# Patient Record
Sex: FEMALE | Race: OTHER | HISPANIC OR LATINO | URBAN - METROPOLITAN AREA
[De-identification: names, ages, dates, MRNs, and addresses within clinical notes are randomized per-mention and may not be internally consistent; named-entity substitution may affect disease eponyms.]

---

## 2017-04-25 ENCOUNTER — INPATIENT (INPATIENT)
Facility: HOSPITAL | Age: 57
LOS: 7 days | Discharge: HOME | DRG: 163 | End: 2017-05-03
Attending: HOSPITALIST | Admitting: HOSPITALIST
Payer: COMMERCIAL

## 2017-04-25 VITALS
HEART RATE: 105 BPM | RESPIRATION RATE: 18 BRPM | DIASTOLIC BLOOD PRESSURE: 69 MMHG | TEMPERATURE: 100 F | HEIGHT: 62 IN | SYSTOLIC BLOOD PRESSURE: 11 MMHG | OXYGEN SATURATION: 94 % | WEIGHT: 139.99 LBS

## 2017-04-25 DIAGNOSIS — J18.9 PNEUMONIA, UNSPECIFIED ORGANISM: ICD-10-CM

## 2017-04-25 DIAGNOSIS — J31.0 CHRONIC RHINITIS: ICD-10-CM

## 2017-04-25 DIAGNOSIS — K29.50 UNSPECIFIED CHRONIC GASTRITIS WITHOUT BLEEDING: ICD-10-CM

## 2017-04-25 DIAGNOSIS — E87.1 HYPO-OSMOLALITY AND HYPONATREMIA: ICD-10-CM

## 2017-04-25 DIAGNOSIS — D64.9 ANEMIA, UNSPECIFIED: ICD-10-CM

## 2017-04-25 DIAGNOSIS — Z98.891 HISTORY OF UTERINE SCAR FROM PREVIOUS SURGERY: Chronic | ICD-10-CM

## 2017-04-25 DIAGNOSIS — Z98.890 OTHER SPECIFIED POSTPROCEDURAL STATES: Chronic | ICD-10-CM

## 2017-04-25 DIAGNOSIS — Z29.9 ENCOUNTER FOR PROPHYLACTIC MEASURES, UNSPECIFIED: ICD-10-CM

## 2017-04-25 LAB
ALBUMIN SERPL ELPH-MCNC: 3.7 G/DL — SIGNIFICANT CHANGE UP (ref 3.3–5.2)
ALP SERPL-CCNC: 118 U/L — SIGNIFICANT CHANGE UP (ref 40–120)
ALT FLD-CCNC: 10 U/L — SIGNIFICANT CHANGE UP
ANION GAP SERPL CALC-SCNC: 14 MMOL/L — SIGNIFICANT CHANGE UP (ref 5–17)
ANISOCYTOSIS BLD QL: SLIGHT — SIGNIFICANT CHANGE UP
APPEARANCE UR: ABNORMAL
AST SERPL-CCNC: 15 U/L — SIGNIFICANT CHANGE UP
BACTERIA # UR AUTO: ABNORMAL
BILIRUB SERPL-MCNC: 0.5 MG/DL — SIGNIFICANT CHANGE UP (ref 0.4–2)
BILIRUB UR-MCNC: NEGATIVE — SIGNIFICANT CHANGE UP
BUN SERPL-MCNC: 16 MG/DL — SIGNIFICANT CHANGE UP (ref 8–20)
CALCIUM SERPL-MCNC: 9 MG/DL — SIGNIFICANT CHANGE UP (ref 8.6–10.2)
CHLORIDE SERPL-SCNC: 96 MMOL/L — LOW (ref 98–107)
CO2 SERPL-SCNC: 24 MMOL/L — SIGNIFICANT CHANGE UP (ref 22–29)
COLOR SPEC: YELLOW — SIGNIFICANT CHANGE UP
CREAT SERPL-MCNC: 0.52 MG/DL — SIGNIFICANT CHANGE UP (ref 0.5–1.3)
DIFF PNL FLD: ABNORMAL
ELLIPTOCYTES BLD QL SMEAR: SLIGHT — SIGNIFICANT CHANGE UP
EPI CELLS # UR: SIGNIFICANT CHANGE UP
GLUCOSE SERPL-MCNC: 133 MG/DL — HIGH (ref 70–115)
GLUCOSE UR QL: NEGATIVE MG/DL — SIGNIFICANT CHANGE UP
HCG UR QL: NEGATIVE — SIGNIFICANT CHANGE UP
HCT VFR BLD CALC: 35 % — LOW (ref 37–47)
HGB BLD-MCNC: 11.8 G/DL — LOW (ref 12–16)
KETONES UR-MCNC: ABNORMAL
LACTATE BLDV-MCNC: 0.7 MMOL/L — SIGNIFICANT CHANGE UP (ref 0.7–2)
LACTATE BLDV-MCNC: 1.2 MMOL/L — SIGNIFICANT CHANGE UP (ref 0.5–2)
LEUKOCYTE ESTERASE UR-ACNC: ABNORMAL
LIDOCAIN IGE QN: 21 U/L — LOW (ref 22–51)
LYMPHOCYTES # BLD AUTO: 3 % — LOW (ref 20–55)
MCHC RBC-ENTMCNC: 30.2 PG — SIGNIFICANT CHANGE UP (ref 27–31)
MCHC RBC-ENTMCNC: 33.7 G/DL — SIGNIFICANT CHANGE UP (ref 32–36)
MCV RBC AUTO: 89.5 FL — SIGNIFICANT CHANGE UP (ref 81–99)
MONOCYTES NFR BLD AUTO: 4 % — SIGNIFICANT CHANGE UP (ref 3–10)
NEUTROPHILS NFR BLD AUTO: 92 % — HIGH (ref 37–73)
NEUTS BAND # BLD: 1 % — SIGNIFICANT CHANGE UP (ref 0–8)
NITRITE UR-MCNC: NEGATIVE — SIGNIFICANT CHANGE UP
OVALOCYTES BLD QL SMEAR: SLIGHT — SIGNIFICANT CHANGE UP
PH UR: 5 — SIGNIFICANT CHANGE UP (ref 5–8)
PLAT MORPH BLD: SIGNIFICANT CHANGE UP
PLATELET # BLD AUTO: 342 K/UL — SIGNIFICANT CHANGE UP (ref 150–400)
POIKILOCYTOSIS BLD QL AUTO: SLIGHT — SIGNIFICANT CHANGE UP
POLYCHROMASIA BLD QL SMEAR: SLIGHT — SIGNIFICANT CHANGE UP
POTASSIUM SERPL-MCNC: 4 MMOL/L — SIGNIFICANT CHANGE UP (ref 3.5–5.3)
POTASSIUM SERPL-SCNC: 4 MMOL/L — SIGNIFICANT CHANGE UP (ref 3.5–5.3)
PROT SERPL-MCNC: 7.4 G/DL — SIGNIFICANT CHANGE UP (ref 6.6–8.7)
PROT UR-MCNC: 30 MG/DL
RAPID RVP RESULT: SIGNIFICANT CHANGE UP
RBC # BLD: 3.91 M/UL — LOW (ref 4.4–5.2)
RBC # FLD: 12.8 % — SIGNIFICANT CHANGE UP (ref 11–15.6)
RBC BLD AUTO: ABNORMAL
RBC CASTS # UR COMP ASSIST: ABNORMAL /HPF (ref 0–4)
SODIUM SERPL-SCNC: 134 MMOL/L — LOW (ref 135–145)
SP GR SPEC: 1.02 — SIGNIFICANT CHANGE UP (ref 1.01–1.02)
TROPONIN T SERPL-MCNC: <0.01 NG/ML — SIGNIFICANT CHANGE UP (ref 0–0.06)
UROBILINOGEN FLD QL: NEGATIVE MG/DL — SIGNIFICANT CHANGE UP
WBC # BLD: 27.2 K/UL — HIGH (ref 4.8–10.8)
WBC # FLD AUTO: 27.2 K/UL — HIGH (ref 4.8–10.8)
WBC UR QL: ABNORMAL

## 2017-04-25 PROCEDURE — 99223 1ST HOSP IP/OBS HIGH 75: CPT

## 2017-04-25 PROCEDURE — 93010 ELECTROCARDIOGRAM REPORT: CPT

## 2017-04-25 PROCEDURE — 99285 EMERGENCY DEPT VISIT HI MDM: CPT | Mod: 25

## 2017-04-25 PROCEDURE — 76705 ECHO EXAM OF ABDOMEN: CPT | Mod: 26

## 2017-04-25 PROCEDURE — 71020: CPT | Mod: 26

## 2017-04-25 RX ORDER — SODIUM CHLORIDE 9 MG/ML
1000 INJECTION INTRAMUSCULAR; INTRAVENOUS; SUBCUTANEOUS ONCE
Qty: 0 | Refills: 0 | Status: COMPLETED | OUTPATIENT
Start: 2017-04-25 | End: 2017-04-25

## 2017-04-25 RX ORDER — OMEPRAZOLE 10 MG/1
0 CAPSULE, DELAYED RELEASE ORAL
Qty: 0 | Refills: 0 | COMMUNITY

## 2017-04-25 RX ORDER — AZITHROMYCIN 500 MG/1
TABLET, FILM COATED ORAL
Qty: 0 | Refills: 0 | Status: DISCONTINUED | OUTPATIENT
Start: 2017-04-25 | End: 2017-04-29

## 2017-04-25 RX ORDER — AZITHROMYCIN 500 MG/1
500 TABLET, FILM COATED ORAL ONCE
Qty: 0 | Refills: 0 | Status: COMPLETED | OUTPATIENT
Start: 2017-04-25 | End: 2017-04-25

## 2017-04-25 RX ORDER — IBUPROFEN 200 MG
2 TABLET ORAL
Qty: 0 | Refills: 0 | COMMUNITY

## 2017-04-25 RX ORDER — FAMOTIDINE 10 MG/ML
20 INJECTION INTRAVENOUS ONCE
Qty: 0 | Refills: 0 | Status: COMPLETED | OUTPATIENT
Start: 2017-04-25 | End: 2017-04-25

## 2017-04-25 RX ORDER — KETOROLAC TROMETHAMINE 30 MG/ML
30 SYRINGE (ML) INJECTION ONCE
Qty: 0 | Refills: 0 | Status: DISCONTINUED | OUTPATIENT
Start: 2017-04-25 | End: 2017-04-25

## 2017-04-25 RX ORDER — MORPHINE SULFATE 50 MG/1
4 CAPSULE, EXTENDED RELEASE ORAL ONCE
Qty: 0 | Refills: 0 | Status: DISCONTINUED | OUTPATIENT
Start: 2017-04-25 | End: 2017-04-25

## 2017-04-25 RX ORDER — ONDANSETRON 8 MG/1
4 TABLET, FILM COATED ORAL EVERY 6 HOURS
Qty: 0 | Refills: 0 | Status: DISCONTINUED | OUTPATIENT
Start: 2017-04-25 | End: 2017-04-28

## 2017-04-25 RX ORDER — PANTOPRAZOLE SODIUM 20 MG/1
40 TABLET, DELAYED RELEASE ORAL
Qty: 0 | Refills: 0 | Status: DISCONTINUED | OUTPATIENT
Start: 2017-04-25 | End: 2017-04-28

## 2017-04-25 RX ORDER — MORPHINE SULFATE 50 MG/1
2 CAPSULE, EXTENDED RELEASE ORAL EVERY 6 HOURS
Qty: 0 | Refills: 0 | Status: DISCONTINUED | OUTPATIENT
Start: 2017-04-25 | End: 2017-04-28

## 2017-04-25 RX ORDER — ACETAMINOPHEN 500 MG
650 TABLET ORAL EVERY 6 HOURS
Qty: 0 | Refills: 0 | Status: DISCONTINUED | OUTPATIENT
Start: 2017-04-25 | End: 2017-04-28

## 2017-04-25 RX ORDER — FLUTICASONE PROPIONATE 50 MCG
1 SPRAY, SUSPENSION NASAL DAILY
Qty: 0 | Refills: 0 | Status: DISCONTINUED | OUTPATIENT
Start: 2017-04-25 | End: 2017-05-03

## 2017-04-25 RX ORDER — ENOXAPARIN SODIUM 100 MG/ML
40 INJECTION SUBCUTANEOUS EVERY 24 HOURS
Qty: 0 | Refills: 0 | Status: DISCONTINUED | OUTPATIENT
Start: 2017-04-25 | End: 2017-04-27

## 2017-04-25 RX ORDER — SODIUM CHLORIDE 9 MG/ML
3 INJECTION INTRAMUSCULAR; INTRAVENOUS; SUBCUTANEOUS ONCE
Qty: 0 | Refills: 0 | Status: COMPLETED | OUTPATIENT
Start: 2017-04-25 | End: 2017-04-25

## 2017-04-25 RX ORDER — KETOROLAC TROMETHAMINE 30 MG/ML
15 SYRINGE (ML) INJECTION EVERY 6 HOURS
Qty: 0 | Refills: 0 | Status: DISCONTINUED | OUTPATIENT
Start: 2017-04-25 | End: 2017-04-28

## 2017-04-25 RX ORDER — ONDANSETRON 8 MG/1
4 TABLET, FILM COATED ORAL ONCE
Qty: 0 | Refills: 0 | Status: COMPLETED | OUTPATIENT
Start: 2017-04-25 | End: 2017-04-25

## 2017-04-25 RX ORDER — FLUTICASONE PROPIONATE 50 MCG
1 SPRAY, SUSPENSION NASAL
Qty: 0 | Refills: 0 | COMMUNITY

## 2017-04-25 RX ORDER — CEFTRIAXONE 500 MG/1
1 INJECTION, POWDER, FOR SOLUTION INTRAMUSCULAR; INTRAVENOUS EVERY 24 HOURS
Qty: 0 | Refills: 0 | Status: DISCONTINUED | OUTPATIENT
Start: 2017-04-25 | End: 2017-05-02

## 2017-04-25 RX ORDER — AZITHROMYCIN 500 MG/1
500 TABLET, FILM COATED ORAL EVERY 24 HOURS
Qty: 0 | Refills: 0 | Status: DISCONTINUED | OUTPATIENT
Start: 2017-04-26 | End: 2017-04-29

## 2017-04-25 RX ORDER — CEFOTETAN DISODIUM 1 G
2 VIAL (EA) INJECTION EVERY 12 HOURS
Qty: 0 | Refills: 0 | Status: DISCONTINUED | OUTPATIENT
Start: 2017-04-25 | End: 2017-04-25

## 2017-04-25 RX ADMIN — Medication 100 GRAM(S): at 05:56

## 2017-04-25 RX ADMIN — Medication 30 MILLIGRAM(S): at 05:22

## 2017-04-25 RX ADMIN — ONDANSETRON 4 MILLIGRAM(S): 8 TABLET, FILM COATED ORAL at 05:22

## 2017-04-25 RX ADMIN — SODIUM CHLORIDE 3 MILLILITER(S): 9 INJECTION INTRAMUSCULAR; INTRAVENOUS; SUBCUTANEOUS at 05:22

## 2017-04-25 RX ADMIN — FAMOTIDINE 20 MILLIGRAM(S): 10 INJECTION INTRAVENOUS at 05:22

## 2017-04-25 RX ADMIN — Medication 650 MILLIGRAM(S): at 10:53

## 2017-04-25 RX ADMIN — Medication 650 MILLIGRAM(S): at 01:10

## 2017-04-25 RX ADMIN — MORPHINE SULFATE 4 MILLIGRAM(S): 50 CAPSULE, EXTENDED RELEASE ORAL at 05:55

## 2017-04-25 RX ADMIN — SODIUM CHLORIDE 1000 MILLILITER(S): 9 INJECTION INTRAMUSCULAR; INTRAVENOUS; SUBCUTANEOUS at 05:22

## 2017-04-25 RX ADMIN — CEFTRIAXONE 100 GRAM(S): 500 INJECTION, POWDER, FOR SOLUTION INTRAMUSCULAR; INTRAVENOUS at 13:41

## 2017-04-25 RX ADMIN — ENOXAPARIN SODIUM 40 MILLIGRAM(S): 100 INJECTION SUBCUTANEOUS at 10:48

## 2017-04-25 RX ADMIN — Medication 30 MILLIGRAM(S): at 05:55

## 2017-04-25 RX ADMIN — AZITHROMYCIN 255 MILLIGRAM(S): 500 TABLET, FILM COATED ORAL at 10:48

## 2017-04-25 RX ADMIN — MORPHINE SULFATE 4 MILLIGRAM(S): 50 CAPSULE, EXTENDED RELEASE ORAL at 05:19

## 2017-04-25 NOTE — ED PROVIDER NOTE - OBJECTIVE STATEMENT
55 yo female complains of chest pain, cough, fevers, chills, x several days.   h/o gallstones in past as well  no exacerbating or relieving factors.

## 2017-04-25 NOTE — H&P ADULT - PROBLEM SELECTOR PLAN 1
Will admit for IV antibiotics  Rocephin, Zithromax  Blood and sputum cultures  RVP    Oxygen as needed  Robitussin for cough  Tylenol for fever, pain  Repeat Chest X-Ray in am

## 2017-04-25 NOTE — H&P ADULT - ATTENDING COMMENTS
Discussed with patient and spouse. Offered option of home with PO with close follow up vs admit for IV. They decided to stay given follow up limited.

## 2017-04-25 NOTE — ED ADULT NURSE NOTE - OBJECTIVE STATEMENT
Pt. presents to ED with c/o epigastric pain and cough for past week. Pain worse with coughing, additional fever and weakness for past four days.

## 2017-04-25 NOTE — ED PROVIDER NOTE - CONSTITUTIONAL, MLM
normal... Well appearing, well nourished, awake, alert, oriented to person, place, time/situation. appears slgihtly uncomfortable

## 2017-04-25 NOTE — ED ADULT NURSE REASSESSMENT NOTE - NS ED NURSE REASSESS COMMENT FT1
pt received awake with oxygen via nasal cannula , breathing even and unlabored. pt with 20 gauge saline lock to left forearm no signs of infiltration noted. pt s/p ultrasound resting comfortably. pt admitted awaiting bed assignment

## 2017-04-25 NOTE — H&P ADULT - HISTORY OF PRESENT ILLNESS
56 year old female with PMHx Gastritis presents with left sided chest pain for 4 days. Describes as pounding, 8/10 in severity constant and radiating to left back associated with chills and fever for 1 day. She has been having Advil 2 pills every 4 hours. She did have cough recently, which has subsided. She also feels nauseous but denies any abdominal pain,  vomiting or constipation. She also states shes having diarrhea for past 3 days, loose 2-3 times/days, yellow, non-bloody.  Denies any palpitation, dizziness or headaches  Her  has an URTI.  She denies any smoking, DM or alcoholism.  Works in maintenance, cleaning offices.

## 2017-04-25 NOTE — ED PROVIDER NOTE - PROGRESS NOTE DETAILS
labs and imaging reviewed.   pneumonia + UTI  treated with abx, antipyretics, and iv fluids. initially given cefotetan b/c history of gallstones and ruq tenderness.   should cover lower resp tract and UTI

## 2017-04-26 DIAGNOSIS — E87.6 HYPOKALEMIA: ICD-10-CM

## 2017-04-26 LAB
ANION GAP SERPL CALC-SCNC: 16 MMOL/L — SIGNIFICANT CHANGE UP (ref 5–17)
BUN SERPL-MCNC: 18 MG/DL — SIGNIFICANT CHANGE UP (ref 8–20)
CALCIUM SERPL-MCNC: 8.6 MG/DL — SIGNIFICANT CHANGE UP (ref 8.6–10.2)
CHLORIDE SERPL-SCNC: 100 MMOL/L — SIGNIFICANT CHANGE UP (ref 98–107)
CO2 SERPL-SCNC: 24 MMOL/L — SIGNIFICANT CHANGE UP (ref 22–29)
CREAT SERPL-MCNC: 0.56 MG/DL — SIGNIFICANT CHANGE UP (ref 0.5–1.3)
GLUCOSE SERPL-MCNC: 97 MG/DL — SIGNIFICANT CHANGE UP (ref 70–115)
HCT VFR BLD CALC: 31.1 % — LOW (ref 37–47)
HGB BLD-MCNC: 10.3 G/DL — LOW (ref 12–16)
LEGIONELLA AG UR QL: NEGATIVE — SIGNIFICANT CHANGE UP
MCHC RBC-ENTMCNC: 29.9 PG — SIGNIFICANT CHANGE UP (ref 27–31)
MCHC RBC-ENTMCNC: 33.1 G/DL — SIGNIFICANT CHANGE UP (ref 32–36)
MCV RBC AUTO: 90.4 FL — SIGNIFICANT CHANGE UP (ref 81–99)
PLATELET # BLD AUTO: 344 K/UL — SIGNIFICANT CHANGE UP (ref 150–400)
POTASSIUM SERPL-MCNC: 3.5 MMOL/L — SIGNIFICANT CHANGE UP (ref 3.5–5.3)
POTASSIUM SERPL-SCNC: 3.5 MMOL/L — SIGNIFICANT CHANGE UP (ref 3.5–5.3)
RBC # BLD: 3.44 M/UL — LOW (ref 4.4–5.2)
RBC # FLD: 12.8 % — SIGNIFICANT CHANGE UP (ref 11–15.6)
SODIUM SERPL-SCNC: 140 MMOL/L — SIGNIFICANT CHANGE UP (ref 135–145)
WBC # BLD: 20.4 K/UL — HIGH (ref 4.8–10.8)
WBC # FLD AUTO: 20.4 K/UL — HIGH (ref 4.8–10.8)

## 2017-04-26 PROCEDURE — 99233 SBSQ HOSP IP/OBS HIGH 50: CPT

## 2017-04-26 RX ORDER — POTASSIUM CHLORIDE 20 MEQ
40 PACKET (EA) ORAL ONCE
Qty: 0 | Refills: 0 | Status: COMPLETED | OUTPATIENT
Start: 2017-04-26 | End: 2017-04-26

## 2017-04-26 RX ADMIN — CEFTRIAXONE 100 GRAM(S): 500 INJECTION, POWDER, FOR SOLUTION INTRAMUSCULAR; INTRAVENOUS at 11:27

## 2017-04-26 RX ADMIN — AZITHROMYCIN 255 MILLIGRAM(S): 500 TABLET, FILM COATED ORAL at 08:17

## 2017-04-26 RX ADMIN — Medication 1 SPRAY(S): at 11:27

## 2017-04-26 RX ADMIN — PANTOPRAZOLE SODIUM 40 MILLIGRAM(S): 20 TABLET, DELAYED RELEASE ORAL at 07:16

## 2017-04-26 RX ADMIN — Medication 650 MILLIGRAM(S): at 15:31

## 2017-04-26 RX ADMIN — ENOXAPARIN SODIUM 40 MILLIGRAM(S): 100 INJECTION SUBCUTANEOUS at 11:27

## 2017-04-26 RX ADMIN — Medication 650 MILLIGRAM(S): at 08:25

## 2017-04-26 RX ADMIN — Medication 100 MILLIGRAM(S): at 08:31

## 2017-04-26 NOTE — PROGRESS NOTE ADULT - ASSESSMENT
56 year old female with chronic gastritis presents with left sided chest pain, fever, leukocytosis with left shift and Chest X-Ray consistent with left lung CAP (Community acquired pneumonia). She doesn't seem to have overt sepsis. Given severity of leukocytosis, relative hypoxia and relative inability to follow up with PMD if she is sent home on PO antibiotics increases her risk despite PSI of II.  Clinically improved with resolving leukocytosis, afebrile, though still with symptoms.  Chronic gastritis with epigastric tenderness likely exacerbated by NSAID use, mild anemia.

## 2017-04-26 NOTE — PROGRESS NOTE ADULT - SUBJECTIVE AND OBJECTIVE BOX
HOSPITALIST PROGRESS NOTE    JOCELYN VALLECILLO  [id number]  56yFemale    Patient is a 56y old  Female who presents with a chief complaint of left sided chest pain x 4 days (25 Apr 2017 16:20)      SUBJECTIVE:   Chart reviewed since last visit.  Patient seen and examined at bedside earlier today for pneumonia, chest pain.  Feels better. Denies chills but getting night sweats.  Still with chest pain though improved - mild cough.  No longer nauseous.      OBJECTIVE:  Vital Signs Last 24 Hrs  T(C): 36.8, Max: 38.1 (04-26 @ 09:33)  T(F): 98.2, Max: 100.6 (04-26 @ 09:33)  HR: 84 (84 - 99)  BP: 113/55 (99/60 - 132/71)  RR: 18 (18 - 18)  SpO2: 95% (92% - 98%)    PHYSICAL EXAMINATION  General: NAD[+]   nontoxic[+]   ill-appearing[]  Obese[]  HEENT: AT/NC[+]  PERRLA[]  EOMI[]   Moist oral mucosa[]  Pharyngeal exudates[]  NECK: Supple[+]  JVD[] Carotid bruit[]  CVS: RRR[+]  Irregular[]  S1+S2[+]   Murmur[]  RESP: Fair air entry bilaterally[+]   Clear sounds[]   poor effort []  wheeze[-]   Crackles[+]LLL  GI: Soft[+]  Nondistended[+]   Decreased tenderness in epigastrium   Bowel Sounds[]  Mass[]   HSM[]   Ascites[]  : suprapubic tenderness[-]   CVA Tenderness[]   Egan[]  MS: FROM[-]   Edema[-]  CNS: AAOx3[-]      INTEG: Skin is Warm[+]  dry[] Lesion[] Decubitus[]  PSYCH: Fair Mood, Affect            I&O's Summary                          10.3   20.4  )-----------( 344      ( 26 Apr 2017 06:18 )             31.1       04-26    140  |  100  |  18.0  ----------------------------<  97  3.5   |  24.0  |  0.56    Ca    8.6      26 Apr 2017 06:18    TPro  7.4  /  Alb  3.7  /  TBili  0.5  /  DBili  x   /  AST  15  /  ALT  10  /  AlkPhos  118  04-25    CARDIAC MARKERS ( 25 Apr 2017 05:12 )  x     / <0.01 ng/mL / x     / x     / x          Culture: Pending            MEDICATIONS  (STANDING):  azithromycin  IVPB  IV Intermittent   enoxaparin Injectable 40milliGRAM(s) SubCutaneous every 24 hours  pantoprazole    Tablet 40milliGRAM(s) Oral before breakfast  cefTRIAXone   IVPB 1Gram(s) IV Intermittent every 24 hours  azithromycin  IVPB 500milliGRAM(s) IV Intermittent every 24 hours  fluticasone propionate 50 MICROgram(s)/spray Nasal Spray 1Spray(s) Both Nostrils daily      MEDICATIONS  (PRN):  acetaminophen   Tablet 650milliGRAM(s) Oral every 6 hours PRN For Temp greater than 38 C (100.4 F)  acetaminophen   Tablet. 650milliGRAM(s) Oral every 6 hours PRN Mild Pain (1 - 3)  guaiFENesin    Syrup 100milliGRAM(s) Oral every 6 hours PRN Cough  morphine  - Injectable 2milliGRAM(s) SubCutaneous every 6 hours PRN Severe Pain (7 - 10)  ketorolac   Injectable 15milliGRAM(s) IV Push every 6 hours PRN Moderate Pain (4 - 6)  ondansetron Injectable 4milliGRAM(s) IV Push every 6 hours PRN Nausea and/or Vomiting

## 2017-04-27 ENCOUNTER — RESULT REVIEW (OUTPATIENT)
Age: 57
End: 2017-04-27

## 2017-04-27 LAB
ANION GAP SERPL CALC-SCNC: 13 MMOL/L — SIGNIFICANT CHANGE UP (ref 5–17)
BUN SERPL-MCNC: 16 MG/DL — SIGNIFICANT CHANGE UP (ref 8–20)
CALCIUM SERPL-MCNC: 8.4 MG/DL — LOW (ref 8.6–10.2)
CHLORIDE SERPL-SCNC: 101 MMOL/L — SIGNIFICANT CHANGE UP (ref 98–107)
CO2 SERPL-SCNC: 23 MMOL/L — SIGNIFICANT CHANGE UP (ref 22–29)
CREAT SERPL-MCNC: 0.38 MG/DL — LOW (ref 0.5–1.3)
GLUCOSE SERPL-MCNC: 103 MG/DL — SIGNIFICANT CHANGE UP (ref 70–115)
POTASSIUM SERPL-MCNC: 3.6 MMOL/L — SIGNIFICANT CHANGE UP (ref 3.5–5.3)
POTASSIUM SERPL-SCNC: 3.6 MMOL/L — SIGNIFICANT CHANGE UP (ref 3.5–5.3)
SODIUM SERPL-SCNC: 137 MMOL/L — SIGNIFICANT CHANGE UP (ref 135–145)

## 2017-04-27 PROCEDURE — 71250 CT THORAX DX C-: CPT | Mod: 26

## 2017-04-27 PROCEDURE — 71020: CPT | Mod: 26

## 2017-04-27 PROCEDURE — 99255 IP/OBS CONSLTJ NEW/EST HI 80: CPT | Mod: 57

## 2017-04-27 PROCEDURE — 99233 SBSQ HOSP IP/OBS HIGH 50: CPT

## 2017-04-27 RX ADMIN — CEFTRIAXONE 100 GRAM(S): 500 INJECTION, POWDER, FOR SOLUTION INTRAMUSCULAR; INTRAVENOUS at 17:47

## 2017-04-27 RX ADMIN — MORPHINE SULFATE 2 MILLIGRAM(S): 50 CAPSULE, EXTENDED RELEASE ORAL at 09:15

## 2017-04-27 RX ADMIN — Medication 40 MILLIGRAM(S): at 15:41

## 2017-04-27 RX ADMIN — MORPHINE SULFATE 2 MILLIGRAM(S): 50 CAPSULE, EXTENDED RELEASE ORAL at 16:00

## 2017-04-27 RX ADMIN — MORPHINE SULFATE 2 MILLIGRAM(S): 50 CAPSULE, EXTENDED RELEASE ORAL at 15:39

## 2017-04-27 RX ADMIN — MORPHINE SULFATE 2 MILLIGRAM(S): 50 CAPSULE, EXTENDED RELEASE ORAL at 09:01

## 2017-04-27 RX ADMIN — AZITHROMYCIN 255 MILLIGRAM(S): 500 TABLET, FILM COATED ORAL at 15:40

## 2017-04-27 RX ADMIN — Medication 100 MILLIGRAM(S): at 17:48

## 2017-04-27 RX ADMIN — Medication 1 SPRAY(S): at 17:47

## 2017-04-27 NOTE — CONSULT NOTE ADULT - SUBJECTIVE AND OBJECTIVE BOX
Surgeon: Dr. Braga        HISTORY OF PRESENT ILLNESS:  56 year old female admitted to [facility] ER for complaints of left sided chest pain for 3 days.    PAST MEDICAL & SURGICAL HISTORY:  Chronic gastritis without bleeding, unspecified gastritis type  H/O right breast biopsy: States normal  History of       MEDICATIONS  (STANDING):  azithromycin  IVPB  IV Intermittent   enoxaparin Injectable 40milliGRAM(s) SubCutaneous every 24 hours  pantoprazole    Tablet 40milliGRAM(s) Oral before breakfast  cefTRIAXone   IVPB 1Gram(s) IV Intermittent every 24 hours  azithromycin  IVPB 500milliGRAM(s) IV Intermittent every 24 hours  fluticasone propionate 50 MICROgram(s)/spray Nasal Spray 1Spray(s) Both Nostrils daily  predniSONE   Tablet 40milliGRAM(s) Oral daily    MEDICATIONS  (PRN):  acetaminophen   Tablet 650milliGRAM(s) Oral every 6 hours PRN For Temp greater than 38 C (100.4 F)  acetaminophen   Tablet. 650milliGRAM(s) Oral every 6 hours PRN Mild Pain (1 - 3)  guaiFENesin    Syrup 100milliGRAM(s) Oral every 6 hours PRN Cough  morphine  - Injectable 2milliGRAM(s) SubCutaneous every 6 hours PRN Severe Pain (7 - 10)  ketorolac   Injectable 15milliGRAM(s) IV Push every 6 hours PRN Moderate Pain (4 - 6)  ondansetron Injectable 4milliGRAM(s) IV Push every 6 hours PRN Nausea and/or Vomiting    Antiplatelet therapy:                           Last dose/amt:    Allergies    No Known Allergies    Intolerances        SOCIAL HISTORY:  Smoker: [ ] Yes  [ ] No        PACK YEARS:                         WHEN QUIT?  ETOH use: [ ] Yes  [ ] No              FREQUENCY / QUANTITY:  Ilicit Drug use:  [ ] Yes  [ ] No  Occupation:  Live with:  Assisted device use:    FAMILY HISTORY:  No pertinent family history in first degree relatives      Review of Systems  CONSTITUTIONAL:  Fevers[ ] chills[ ] sweats[ ] fatigue[ ] weight loss[ ] weight gain [ ]                                     NEGATIVE [ ]   NEURO:  parathesias[ ] seizures [ ]  syncope [ ]  confusion [ ]                                                                                NEGATIVE[ ]   EYES: glasses[ ]  blurry vision[ ]  discharge[ ] pain[ ] glaucoma [ ]                                                                          NEGATIVE[ ]   ENMT:  difficulty hearing [ ]  vertigo[ ]  dysphagia[ ] epistaxis[ ] recent dental work [ ]                                    NEGATIVE[ ]   CV:  chest pain[ ] palpitations[ ] MUELLER [ ] diaphoresis [ ]                                                                                           NEGATIVE[ ]   RESPIRATORY:  wheezing[ ] SOB[ ] cough [ ] sputum[ ] hemoptysis[ ]                                                                  NEGATIVE[ ]   GI:  nausea[ ]  vommiting [ ]  diarrhea[ ] constipation [ ] melena [ ]                                                                      NEGATIVE[ ]   : hematuria[ ]  dysuria[ ] urgency[ ] incontinence[ ]                                                                                            NEGATIVE[ ]   MUSKULOSKELETAL:  arthritis[ ]  joint swelling [ ] muscle weakness [ ]                                                                NEGATIVE[ ]   SKIN/BREAST:  rash[ ] itching [ ]  hair loss[ ] masses[ ]                                                                                              NEGATIVE[ ]   PSYCH:  dementia [ ] depresion [ ] anxiety[ ]                                                                                                               NEGATIVE[ ]   HEME/LYMPH:  bruises easily[ ] enlarged lymph nodes[ ] tender lymph nodes[ ]                                               NEGATIVE[ ]   ENDOCRINE:  cold intolerance[ ] heat intolerance[ ] polydipsia[ ]                                                                          NEGATIVE[ ]     PHYSICAL EXAM  Vital Signs Last 24 Hrs  T(C): 37.1, Max: 37.6 ( @ 08:02)  T(F): 98.7, Max: 99.6 ( @ 08:02)  HR: 85 (84 - 98)  BP: 120/65 (113/55 - 127/69)  BP(mean): --  RR: 18 (18 - 20)  SpO2: 94% (91% - 95%)    CONSTITUTIONAL:                                                                          WNL[ ]   Neuro: WNL[ ] Normal exam oriented to person/place & time with no focal motor or sensory  deficits. Other __________                    Eyes: WNL[ ]   Normal exam of conjunctiva & lids, pupils equally reactive. Other______________________________     ENT: WNL[ ]    Normal exam of nasal/oral mucosa with absence of cyanosis. Other_____________________________  Neck: WNL[ ]  Normal exam of jugular veins, trachea & thyroid. Other_________________________________________  Chest: WNL[ ] Normal lung exam with good air movement absence of wheezes, rales, or rhonchi: Other_________________________________________                                                                                CV:  Auscultation: normal [ ] S3[ ] S4[ ] Irregular [ ] Rub[ ] Clicks[ ]    Murmurs none:[ ]systolic [ ]  diastolic [ ] holosystolic [ ]  Carotids: No Bruits[ ] Other____________ Abdominal Aorta: normal [ ] nonpalpable[ ]Other___________                                                                                      GI: WNL[ ] Normal exam of abdomen, liver & spleen with no noted masses or tenderness. Other______________________                                                                                                        Extremities: WNL[ ] Normal no evidence of cyanosis or deformity Edema: none[ ]trace[ ]1+[ ]2+[ ]3+[ ]4+[ ]  Lower Extremity Pulses: Right[ ] Left[ ]Varicosities[ ]  SKIN :WNL[ ] Normal exam to inspection & palation. Other:____________________                                                          LABS:                        10.3   20.4  )-----------( 344      ( 2017 06:18 )             31.1     -    137  |  101  |  16.0  ----------------------------<  103  3.6   |  23.0  |  0.38<L>    Ca    8.4<L>      2017 07:58                      Cardiac Cath:    TTE / DELORIS:      Assessment:          Plan: Surgeon: Dr. Braga        HISTORY OF PRESENT ILLNESS:  Pt. examined at bedside with .  56 year old Mosotho speaking female with PMH of chronic gastritis who was admitted to [facility] ER on 17 for complaints of left sided chest pain for 3 days with fever & chills.  Pt. also reports dyspnea & non productive cough. Pt. had abnormal chest xray which indicated left pleural effusion & pneumonia, also presented with leukocytosis.  On  CT of the chest indicated large multiloculated  left hemithorax pleural effusion with residual aeration of the anterior segment of the left upper lobe without midline shift.       PAST MEDICAL & SURGICAL HISTORY:  Chronic gastritis without bleeding, unspecified gastritis type  H/O right breast biopsy: States normal  History of       MEDICATIONS  (STANDING):  azithromycin  IVPB  IV Intermittent   enoxaparin Injectable 40milliGRAM(s) SubCutaneous every 24 hours  pantoprazole    Tablet 40milliGRAM(s) Oral before breakfast  cefTRIAXone   IVPB 1Gram(s) IV Intermittent every 24 hours  azithromycin  IVPB 500milliGRAM(s) IV Intermittent every 24 hours  fluticasone propionate 50 MICROgram(s)/spray Nasal Spray 1Spray(s) Both Nostrils daily  predniSONE   Tablet 40milliGRAM(s) Oral daily    MEDICATIONS  (PRN):  acetaminophen   Tablet 650milliGRAM(s) Oral every 6 hours PRN For Temp greater than 38 C (100.4 F)  acetaminophen   Tablet. 650milliGRAM(s) Oral every 6 hours PRN Mild Pain (1 - 3)  guaiFENesin    Syrup 100milliGRAM(s) Oral every 6 hours PRN Cough  morphine  - Injectable 2milliGRAM(s) SubCutaneous every 6 hours PRN Severe Pain (7 - 10)  ketorolac   Injectable 15milliGRAM(s) IV Push every 6 hours PRN Moderate Pain (4 - 6)  ondansetron Injectable 4milliGRAM(s) IV Push every 6 hours PRN Nausea and/or Vomiting    Antiplatelet therapy:                           Last dose/amt:    Allergies: No Known Allergies    SOCIAL HISTORY:  Smoker: [x] Yes  [ ] No        PACK YEARS:                         WHEN QUIT? 5 years ago  ETOH use: [x] Yes  [ ] No       on occasions  FREQUENCY / QUANTITY:  Ilicit Drug use:  [ ] Yes  [x] No  Occupation: Environmental   Live with:  & has one child  Assisted device use: none    FAMILY HISTORY:  No pertinent family history in first degree relatives      Review of Systems  CONSTITUTIONAL:  Fevers[x] chills[x] sweats[ ] fatigue[ ] weight loss[ ] weight gain [ ]                                     NEGATIVE [ ]   NEURO:  parathesias[ ] seizures [ ]  syncope [ ]  confusion [ ]                                                                        NEGATIVE[x]   EYES: glasses[ ]  blurry vision[ ]  discharge[ ] pain[ ] glaucoma [ ]                                                                  NEGATIVE[x]   ENMT:  difficulty hearing [ ]  vertigo[ ]  dysphagia[ ] epistaxis[ ] recent dental work [ ]                                       NEGATIVE[x]   CV:  chest pain[ ] palpitations[ ] MUELLER [x] diaphoresis [ ]                                                                                 NEGATIVE[ ]   RESPIRATORY:  wheezing[ ] SOB[ ] cough [x] sputum[ ] hemoptysis[ ]                                                          NEGATIVE[ ]   GI:  nausea[ ]  vommiting [ ]  diarrhea[ ] constipation [ ] melena [ ]                                                                  NEGATIVE[ ]   : hematuria[ ]  dysuria[ ] urgency[ ] incontinence[ ]                                                                                    NEGATIVE[x]   MUSKULOSKELETAL:  arthritis[ ]  joint swelling [ ] muscle weakness [ ]                                                        NEGATIVE[x]   SKIN/BREAST:  rash[ ] itching [ ]  hair loss[ ] masses[ ]                                                                                NEGATIVE[x]   PSYCH:  dementia [ ] depresion [ ] anxiety[ ]                                                                                                NEGATIVE[x]   HEME/LYMPH:  bruises easily[ ] enlarged lymph nodes[ ] tender lymph nodes[ ]                                             NEGATIVE[x]   ENDOCRINE:  cold intolerance[ ] heat intolerance[ ] polydipsia[ ]                                                                    NEGATIVE[x]     PHYSICAL EXAM  Vital Signs Last 24 Hrs  T(C): 37.1, Max: 37.6 ( @ 08:02)  T(F): 98.7, Max: 99.6 ( @ 08:02)  HR: 85 (84 - 98)  BP: 120/65 (113/55 - 127/69)  BP(mean): --  RR: 18 (18 - 20)  SpO2: 94% (91% - 95%)    CONSTITUTIONAL:                                                                         Neuro: WNL[x] Normal exam oriented to person/place & time with no focal motor or sensory  deficits. Other __________                    Eyes: WNL[x]   Normal exam of conjunctiva & lids, pupils equally reactive. Other______________________________     ENT: WNL[x]    Normal exam of nasal/oral mucosa with absence of cyanosis. Other_____________________________  Neck: WNL[x]  Normal exam of jugular veins, trachea & thyroid. Other_________________________________________  Chest: WNL[ ] Normal lung exam with good air movement absence of wheezes, rales, or rhonchi: Other: Diminished  ___________________________________                                                                                CV:  Auscultation: normal [x] S3[ ] S4[ ] Irregular [ ] Rub[ ] Clicks[ ]    Murmurs none:[x]systolic [ ]  diastolic [ ] holosystolic [ ]  Carotids: No Bruits[x]Other____________ Abdominal Aorta: normal [ ] nonpalpable[ ]Other___________                                                                                      GI: WNL[x] Normal exam of abdomen, liver & spleen with no noted masses or tenderness. Other______________________                                                                                                        Extremities: WNL[x] Normal no evidence of cyanosis or deformity Edema: none[ ]trace[ ]1+[ ]2+[ ]3+[ ]4+[ ]  Lower Extremity Pulses: Right[x] Left[x]Varicosities[ ]  SKIN :WNL[x] Normal exam to inspection & palation. Other:____________________                                                          LABS:                        10.3   20.4  )-----------( 344      ( 2017 06:18 )             31.1     -    137  |  101  |  16.0  ----------------------------<  103  3.6   |  23.0  |  0.38<L>    Ca    8.4<L>      2017 07:58        Chest CT:PROCEDURE DATE:  2017        INTERPRETATION:  Chest CT without contrast .  COMPARISON: Chest x-ray of 2017.  CLINICAL INFORMATION: Pacify left hemithorax. Assess for mass, effusion,   lobar consolidation..  TECHNIQUE: Contiguous axial 2.5 mm slice thickness images of the chest   were obtained without intravenous contrast administration.  FINDINGS:  There is multifocal large loculated pleural effusions with complete   atelectasis of the left lower lobe and residual aeration of the left   upper lobe anterior segment. The left upper lobe a loculated effusion   measures 11 x 6.6 cm and the left lower lobe loculated effusion measures   8.3 x 5.1 cm on sagittal imaging. Malignant effusion cannot be excluded.   The  Right lung parenchyma remains clear. There is a small right effusion.  The trachea and right left mainstem bronchi are patent. There is   narrowing of the left upper and lower lobe segmental bronchi.    Left hilar mass cannot be excluded. There is no pericardial effusion.   There is mild cardiomegaly. Thoracic aorta is normal.    Upper abdominal viscera unremarkable.  Osseous structures intact.     IMPRESSION:    Large multiloculated  left hemithorax pleural effusion with residual   aeration of the anterior segment of the left upper lobe without midline   shift. Right lung clear aside from a small right effusion. Left hilar       Assessment: 56 year old [place]o speaking female with PMH as stated as above,  pt. had abnormal chest xray which indicated left pleural effusion & pneumonia, & also presented with leukocytosis.  On  CT of the chest indicated large multiloculated  left hemithorax pleural effusion with residual aeration of the anterior segment of the left upper lobe without midline shift.     Plan:  NPO after midnight.  Decortication of the left lung scheduled for 17.  Discussed plan with Dr. Braga.

## 2017-04-27 NOTE — PROGRESS NOTE ADULT - ASSESSMENT
56 year old female with chronic gastritis presents with left sided chest pain, fever, leukocytosis with left shift and Chest X-Ray consistent with left lung CAP (Community acquired pneumonia). She doesn't seem to have overt sepsis. Given severity of leukocytosis, relative hypoxia and relative inability to follow up with PMD if she is sent home on PO antibiotics increases her risk despite PSI of II.  Continues to have symptoms, worsening Chest X-Ray. CTscan Chest (04/27/17)with Large multiloculated  left hemithorax pleural effusion with residual aeration of the anterior segment of the left upper lobe without midline shift   Chronic gastritis with epigastric tenderness likely exacerbated by NSAID use, mild anemia.

## 2017-04-27 NOTE — CONSULT NOTE ADULT - ATTENDING COMMENTS
Pt seen and images reviewed.  Loculated Lt pleural effusion suspicous for empyema.  Will plan for VATS tomorrow (Dr. Bejarano)

## 2017-04-27 NOTE — CONSULT NOTE ADULT - ASSESSMENT
CAP with complex pleural effusion.  Possible empyema.  Likely pneumococcal pneumonia.      Plan:Thoracic surgery evaluation.  Will likely require VATS.  Consideration toward percutaneous drainage but unlikely to be successful.  Current antibiotics likely adequate.  Add prednisone for 3 days at 40 mg per day  Discussed with  and Maria Luz.

## 2017-04-27 NOTE — PROGRESS NOTE ADULT - SUBJECTIVE AND OBJECTIVE BOX
HOSPITALIST PROGRESS NOTE    JOCELYN VALLECILLO  [id number]  56yFemale    Patient is a 56y old  Female who presents with a chief complaint of left sided chest pain x 4 days (25 Apr 2017 16:20)      SUBJECTIVE:   Chart reviewed since last visit.  Patient seen and examined at bedside earlier today for CAP.  Still with chills and fever.  Feels minimally better if at all.  Mild cough - non-productive.  Still dyspneic, worse on ambulation to bathroom.  Chest pain and back pain still present.  No nausea vomiting or abdominal pain.      OBJECTIVE:  Vital Signs Last 24 Hrs  T(C): 37.1, Max: 37.6 (04-27 @ 08:02)  T(F): 98.7, Max: 99.6 (04-27 @ 08:02)  HR: 85 (85 - 98)  BP: 120/65 (118/67 - 127/69)  RR: 18 (18 - 20)  SpO2: 94% (91% - 94%)    PHYSICAL EXAMINATION  General: NAD[+]   nontoxic[+]     HEENT: AT/NC[+]   NECK: Supple[+]    CVS: RRR[+]  S1+S2[+]    RESP: Fair air entry bilaterally[+]   wheeze[-]   Crackles[+]LLL Decreased sounds left lung  GI: Soft[+]  Nondistended[+]     Bowel Sounds[+]    : suprapubic tenderness[-]     MS: FROM[-]   Edema[-]  CNS: AAOx3[-]      INTEG: Skin is Warm[+]  dry[]   PSYCH: Fair Mood, Affect        I&O's Summary                          10.3   20.4  )-----------( 344      ( 26 Apr 2017 06:18 )             31.1       04-27    137  |  101  |  16.0  ----------------------------<  103  3.6   |  23.0  |  0.38<L>    Ca    8.4<L>      27 Apr 2017 07:58      Culture - Blood (04.25.17 @ 05:55)    Specimen Source: .Blood Blood    Culture Results:   No growth at 48 hours     EXAM:  CT CHEST                          PROCEDURE DATE:  04/27/2017        INTERPRETATION:  Chest CT without contrast .  COMPARISON: Chest x-ray of 4/26/2017.  CLINICAL INFORMATION: Pacify left hemithorax. Assess for mass, effusion,   lobar consolidation..  TECHNIQUE: Contiguous axial 2.5 mm slice thickness images of the chest   were obtained without intravenous contrast administration.  FINDINGS:  There is multifocal large loculated pleural effusions with complete   atelectasis of the left lower lobe and residual aeration of the left   upper lobe anterior segment. The left upper lobe a loculated effusion   measures 11 x 6.6 cm and the left lower lobe loculated effusion measures   8.3 x 5.1 cm on sagittal imaging. Malignant effusion cannot be excluded.   The  Right lung parenchyma remains clear. There is a small right effusion.  The trachea and right left mainstem bronchi are patent. There is   narrowing of the left upper and lower lobe segmental bronchi.    Left hilar mass cannot be excluded. There is no pericardial effusion.   There is mild cardiomegaly. Thoracic aorta is normal.    Upper abdominal viscera unremarkable.  Osseous structures intact.     IMPRESSION:    Large multiloculated  left hemithorax pleural effusion with residual   aeration of the anterior segment of the left upper lobe without midline   shift. Right lung clear aside from a small right effusion. Left hilar   mass cannot be excluded. Malignant effusion cannot be excluded..                LAXMI LANE M.D., ATTENDING RADIOLOGIST  This document has been electronically signed. Apr 27 2017  1:54PM          MEDICATIONS  (STANDING):  azithromycin  IVPB  IV Intermittent   pantoprazole    Tablet 40milliGRAM(s) Oral before breakfast  cefTRIAXone   IVPB 1Gram(s) IV Intermittent every 24 hours  azithromycin  IVPB 500milliGRAM(s) IV Intermittent every 24 hours  fluticasone propionate 50 MICROgram(s)/spray Nasal Spray 1Spray(s) Both Nostrils daily  predniSONE   Tablet 40milliGRAM(s) Oral daily      MEDICATIONS  (PRN):  acetaminophen   Tablet 650milliGRAM(s) Oral every 6 hours PRN For Temp greater than 38 C (100.4 F)  acetaminophen   Tablet. 650milliGRAM(s) Oral every 6 hours PRN Mild Pain (1 - 3)  guaiFENesin    Syrup 100milliGRAM(s) Oral every 6 hours PRN Cough  morphine  - Injectable 2milliGRAM(s) SubCutaneous every 6 hours PRN Severe Pain (7 - 10)  ketorolac   Injectable 15milliGRAM(s) IV Push every 6 hours PRN Moderate Pain (4 - 6)  ondansetron Injectable 4milliGRAM(s) IV Push every 6 hours PRN Nausea and/or Vomiting

## 2017-04-27 NOTE — CONSULT NOTE ADULT - SUBJECTIVE AND OBJECTIVE BOX
PULMONARY CONSULT NOTE      GIOVANNA VALLECILLO-[id number]    Patient is a 56y old  Female who presents with a chief complaint of left sided chest pain x 4 days (2017 16:20)  Patient without prior significant pulmonary history with 2 weeks of cough, chills, ?fevers presented to ER with abnormal chest xray, increased WBC's and admitted for pneumonia.  Low grade temp episodically with Ceftriaxone and Azithromycin and repeat chest film with evidence of obscuration or left hemithorax.  Patient with persistent cough but minimal sputum.  +dyspnea.  Smokes approximately 2 cigarettes per day until 5 years ago.  No significant family history.  Gives history of gastritis.  Currently in no acute distress.     INTERVAL HPI/OVERNIGHT EVENTS:    MEDICATIONS  (STANDING):  azithromycin  IVPB  IV Intermittent   enoxaparin Injectable 40milliGRAM(s) SubCutaneous every 24 hours  pantoprazole    Tablet 40milliGRAM(s) Oral before breakfast  cefTRIAXone   IVPB 1Gram(s) IV Intermittent every 24 hours  azithromycin  IVPB 500milliGRAM(s) IV Intermittent every 24 hours  fluticasone propionate 50 MICROgram(s)/spray Nasal Spray 1Spray(s) Both Nostrils daily  predniSONE   Tablet 40milliGRAM(s) Oral daily      MEDICATIONS  (PRN):  acetaminophen   Tablet 650milliGRAM(s) Oral every 6 hours PRN For Temp greater than 38 C (100.4 F)  acetaminophen   Tablet. 650milliGRAM(s) Oral every 6 hours PRN Mild Pain (1 - 3)  guaiFENesin    Syrup 100milliGRAM(s) Oral every 6 hours PRN Cough  morphine  - Injectable 2milliGRAM(s) SubCutaneous every 6 hours PRN Severe Pain (7 - 10)  ketorolac   Injectable 15milliGRAM(s) IV Push every 6 hours PRN Moderate Pain (4 - 6)  ondansetron Injectable 4milliGRAM(s) IV Push every 6 hours PRN Nausea and/or Vomiting      Allergies    No Known Allergies    Intolerances        PAST MEDICAL & SURGICAL HISTORY:  Chronic gastritis without bleeding, unspecified gastritis type  H/O right breast biopsy: States normal  History of       FAMILY HISTORY:  No pertinent family history in first degree relatives      SOCIAL HISTORY  Smoking History: Per HPI    REVIEW OF SYSTEMS:    CONSTITUTIONAL:  As per HPI.    HEENT:  Eyes:  No diplopia or blurred vision. ENT:  No earache, sore throat or runny nose.    CARDIOVASCULAR:  No pressure, squeezing, tightness, heaviness or aching about the chest; no palpitations.    RESPIRATORY:  Per HPI    GASTROINTESTINAL:  No nausea, vomiting or diarrhea.    GENITOURINARY:  No dysuria, frequency or urgency.    MUSCULOSKELETAL:  No joint pains    SKIN:  No new lesions.    NEUROLOGIC:  No paresthesias, fasciculations, seizures or weakness.    PSYCHIATRIC:  No disorder of thought or mood.    ENDOCRINE:  No heat or cold intolerance, polyuria or polydipsia.    HEMATOLOGICAL:  No easy bruising or bleeding.     Vital Signs Last 24 Hrs  T(C): 37.6, Max: 38.1 ( @ 16:07)  T(F): 99.6, Max: 100.6 ( @ 16:07)  HR: 98 (84 - 98)  BP: 118/67 (113/55 - 132/71)  BP(mean): --  RR: 20 (18 - 20)  SpO2: 91% (91% - 95%)    PHYSICAL EXAMINATION:    GENERAL: The patient is a well-developed, well-nourished _____in no apparent distress.     HEENT: Head is normocephalic and atraumatic. Extraocular muscles are intact. Mucous membranes are moist.     NECK: Supple.     LUNGS:Markedly diminished breath sounds left     HEART: Regular rate and rhythm without murmur.    ABDOMEN: Soft, nontender, and nondistended.  No hepatosplenomegaly is noted.    EXTREMITIES: Without any cyanosis, clubbing, rash, lesions or edema.    NEUROLOGIC: Grossly intact.    SKIN: No ulceration or induration present.      LABS:                        10.3   20.4  )-----------( 344      ( 2017 06:18 )             31.1         137  |  101  |  16.0  ----------------------------<  103  3.6   |  23.0  |  0.38<L>    Ca    8.4<L>      2017 07:58                          MICROBIOLOGY:    RADIOLOGY & ADDITIONAL STUDIES:  INTERPRETATION:  Chest CT without contrast .  COMPARISON: Chest x-ray of 2017.  CLINICAL INFORMATION: Pacify left hemithorax. Assess for mass, effusion,   lobar consolidation..  TECHNIQUE: Contiguous axial 2.5 mm slice thickness images of the chest   were obtained without intravenous contrast administration.  FINDINGS:  There is multifocal large loculated pleural effusions with complete   atelectasis of the left lower lobe and residual aeration of the left   upper lobe anterior segment. The left upper lobe a loculated effusion   measures 11 x 6.6 cm and the left lower lobe loculated effusion measures   8.3 x 5.1 cm on sagittal imaging. Malignant effusion cannot be excluded.   The  Right lung parenchyma remains clear. There is a small right effusion.  The trachea and right left mainstem bronchi are patent. There is   narrowing of the left upper and lower lobe segmental bronchi.    Left hilar mass cannot be excluded. There is no pericardial effusion.   There is mild cardiomegaly. Thoracic aorta is normal.    Upper abdominal viscera unremarkable.  Osseous structures intact.     IMPRESSION:    Large multiloculated  left hemithorax pleural effusion with residual   aeration of the anterior segment of the left upper lobe without midline   shift. Right lung clear aside from a small right effusion. Left hilar   mass cannot be excluded. Malignant e

## 2017-04-28 ENCOUNTER — APPOINTMENT (OUTPATIENT)
Dept: THORACIC SURGERY | Facility: HOSPITAL | Age: 57
End: 2017-04-28

## 2017-04-28 ENCOUNTER — TRANSCRIPTION ENCOUNTER (OUTPATIENT)
Age: 57
End: 2017-04-28

## 2017-04-28 LAB
ABO RH CONFIRMATION: SIGNIFICANT CHANGE UP
BLD GP AB SCN SERPL QL: SIGNIFICANT CHANGE UP
TYPE + AB SCN PNL BLD: SIGNIFICANT CHANGE UP

## 2017-04-28 PROCEDURE — 88305 TISSUE EXAM BY PATHOLOGIST: CPT | Mod: 26

## 2017-04-28 PROCEDURE — 32652 THORACOSCOPY REM TOTL CORTEX: CPT | Mod: GC

## 2017-04-28 PROCEDURE — 71010: CPT | Mod: 26

## 2017-04-28 PROCEDURE — 31622 DX BRONCHOSCOPE/WASH: CPT | Mod: GC

## 2017-04-28 PROCEDURE — 99233 SBSQ HOSP IP/OBS HIGH 50: CPT

## 2017-04-28 RX ORDER — FENTANYL CITRATE 50 UG/ML
50 INJECTION INTRAVENOUS
Qty: 0 | Refills: 0 | Status: DISCONTINUED | OUTPATIENT
Start: 2017-04-28 | End: 2017-04-28

## 2017-04-28 RX ORDER — IPRATROPIUM/ALBUTEROL SULFATE 18-103MCG
3 AEROSOL WITH ADAPTER (GRAM) INHALATION EVERY 6 HOURS
Qty: 0 | Refills: 0 | Status: DISCONTINUED | OUTPATIENT
Start: 2017-04-28 | End: 2017-05-03

## 2017-04-28 RX ORDER — HYDROMORPHONE HYDROCHLORIDE 2 MG/ML
0.5 INJECTION INTRAMUSCULAR; INTRAVENOUS; SUBCUTANEOUS
Qty: 0 | Refills: 0 | Status: DISCONTINUED | OUTPATIENT
Start: 2017-04-28 | End: 2017-04-28

## 2017-04-28 RX ORDER — ACETAMINOPHEN 500 MG
650 TABLET ORAL EVERY 6 HOURS
Qty: 0 | Refills: 0 | Status: DISCONTINUED | OUTPATIENT
Start: 2017-04-28 | End: 2017-05-03

## 2017-04-28 RX ORDER — FENTANYL CITRATE 50 UG/ML
30 INJECTION INTRAVENOUS
Qty: 0 | Refills: 0 | Status: DISCONTINUED | OUTPATIENT
Start: 2017-04-28 | End: 2017-04-29

## 2017-04-28 RX ORDER — DOCUSATE SODIUM 100 MG
100 CAPSULE ORAL THREE TIMES A DAY
Qty: 0 | Refills: 0 | Status: DISCONTINUED | OUTPATIENT
Start: 2017-04-28 | End: 2017-05-03

## 2017-04-28 RX ORDER — KETOROLAC TROMETHAMINE 30 MG/ML
30 SYRINGE (ML) INJECTION EVERY 6 HOURS
Qty: 0 | Refills: 0 | Status: DISCONTINUED | OUTPATIENT
Start: 2017-04-28 | End: 2017-05-01

## 2017-04-28 RX ORDER — SODIUM CHLORIDE 9 MG/ML
1000 INJECTION, SOLUTION INTRAVENOUS
Qty: 0 | Refills: 0 | Status: DISCONTINUED | OUTPATIENT
Start: 2017-04-28 | End: 2017-04-28

## 2017-04-28 RX ORDER — ENOXAPARIN SODIUM 100 MG/ML
40 INJECTION SUBCUTANEOUS DAILY
Qty: 0 | Refills: 0 | Status: DISCONTINUED | OUTPATIENT
Start: 2017-04-29 | End: 2017-05-03

## 2017-04-28 RX ORDER — ONDANSETRON 8 MG/1
4 TABLET, FILM COATED ORAL ONCE
Qty: 0 | Refills: 0 | Status: DISCONTINUED | OUTPATIENT
Start: 2017-04-28 | End: 2017-04-28

## 2017-04-28 RX ORDER — PANTOPRAZOLE SODIUM 20 MG/1
40 TABLET, DELAYED RELEASE ORAL
Qty: 0 | Refills: 0 | Status: DISCONTINUED | OUTPATIENT
Start: 2017-04-29 | End: 2017-05-03

## 2017-04-28 RX ADMIN — CEFTRIAXONE 100 GRAM(S): 500 INJECTION, POWDER, FOR SOLUTION INTRAMUSCULAR; INTRAVENOUS at 13:30

## 2017-04-28 RX ADMIN — FENTANYL CITRATE 30 MILLILITER(S): 50 INJECTION INTRAVENOUS at 17:43

## 2017-04-28 RX ADMIN — AZITHROMYCIN 255 MILLIGRAM(S): 500 TABLET, FILM COATED ORAL at 10:39

## 2017-04-28 RX ADMIN — FENTANYL CITRATE 30 MILLILITER(S): 50 INJECTION INTRAVENOUS at 19:45

## 2017-04-28 RX ADMIN — Medication 30 MILLIGRAM(S): at 11:21

## 2017-04-28 RX ADMIN — Medication 100 MILLIGRAM(S): at 21:37

## 2017-04-28 RX ADMIN — FENTANYL CITRATE 30 MILLILITER(S): 50 INJECTION INTRAVENOUS at 10:59

## 2017-04-28 RX ADMIN — Medication 3 MILLILITER(S): at 20:40

## 2017-04-28 RX ADMIN — Medication 30 MILLIGRAM(S): at 11:53

## 2017-04-28 RX ADMIN — Medication 3 MILLILITER(S): at 14:00

## 2017-04-28 NOTE — BRIEF OPERATIVE NOTE - PROCEDURE
Bronchoscopy of both lungs  04/28/2017    Active  CELENA  Decortication  of lung  04/28/2017  left lung  Lysis of adhesions/loculations  Active  JWIRIS

## 2017-04-28 NOTE — PROGRESS NOTE ADULT - SUBJECTIVE AND OBJECTIVE BOX
HOSPITALIST PROGRESS NOTE    JOCELYN VALLECILLO  [id number]  56yFemale    Patient is a 56y old  Female who presents with a chief complaint of left sided chest pain x 4 days (25 Apr 2017 16:20)      SUBJECTIVE:   Chart reviewed since last visit.  Patient seen and examined in PACU for pneumonia effusion  VATS earlier today  Cough+  Pain controlled currently.  No fever or chills      OBJECTIVE:  Vital Signs Last 24 Hrs  T(C): 2.9, Max: 37.8 (04-28 @ 10:00)  T(F): 37.3, Max: 100.1 (04-28 @ 10:00)  HR: 80 (72 - 96)  BP: 114/72 (92/70 - 150/76)  RR: 24 (13 - 27)  SpO2: 100% (96% - 100%)    PHYSICAL EXAMINATION  General: NAD[+]   nontoxic[+]     HEENT: AT/NC[+]   NECK: Supple[+]    CVS: RRR[+]  S1+S2[+]    RESP: Fair air entry bilaterally[+]   wheeze[-]   Crackles[+]LLL Decreased sounds left lung Chest tubes x 2 left chest  GI: Soft[+]  Nondistended[+]     Bowel Sounds[+]    : suprapubic tenderness[-]     MS: FROM[-]   Edema[-]  CNS: AAOx3[-]      INTEG: Skin is Warm[+]  dry[]   PSYCH: Fair Mood, Affect  MONITOR:  CAPILLARY BLOOD GLUCOSE        I&O's Summary    I & Os for current day (as of 28 Apr 2017 17:33)  =============================================  IN: 400 ml / OUT: 260 ml / NET: 140 ml        04-27    137  |  101  |  16.0  ----------------------------<  103  3.6   |  23.0  |  0.38<L>    Ca    8.4<L>      27 Apr 2017 07:58              MEDICATIONS  (STANDING):  azithromycin  IVPB  IV Intermittent   cefTRIAXone   IVPB 1Gram(s) IV Intermittent every 24 hours  azithromycin  IVPB 500milliGRAM(s) IV Intermittent every 24 hours  fluticasone propionate 50 MICROgram(s)/spray Nasal Spray 1Spray(s) Both Nostrils daily  predniSONE   Tablet 40milliGRAM(s) Oral daily  lactated ringers. 1000milliLiter(s) IV Continuous <Continuous>  docusate sodium 100milliGRAM(s) Oral three times a day  ALBUTerol/ipratropium for Nebulization 3milliLiter(s) Nebulizer every 6 hours  fentaNYL PCA (50 MICROgram(s)/mL) 30milliLiter(s) PCA Continuous PCA Continuous      MEDICATIONS  (PRN):  guaiFENesin    Syrup 100milliGRAM(s) Oral every 6 hours PRN Cough  fentaNYL    Injectable 50MICROGram(s) IV Push every 5 minutes PRN Severe Pain  ondansetron Injectable 4milliGRAM(s) IV Push once PRN Nausea and/or Vomiting  promethazine IVPB 6.25milliGRAM(s) IV Intermittent once PRN Nausea and/or Vomiting  ketorolac   Injectable 30milliGRAM(s) IV Push every 6 hours PRN Moderate Pain (4 - 6)  acetaminophen   Tablet. 650milliGRAM(s) Oral every 6 hours PRN Mild Pain (1 - 3)

## 2017-04-28 NOTE — PROGRESS NOTE ADULT - ASSESSMENT
56 year old female with chronic gastritis presents with left sided chest pain, fever, leukocytosis with left shift and Chest X-Ray consistent with left lung CAP (Community acquired pneumonia). She doesn't seem to have overt sepsis. Given severity of leukocytosis, relative hypoxia and relative inability to follow up with PMD if she is sent home on PO antibiotics increases her risk despite PSI of II.  Continues to have symptoms, worsening Chest X-Ray. CTscan Chest (04/27/17)with Large multiloculated  left hemithorax pleural effusion with residual aeration of the anterior segment of the left upper lobe without midline shift. Underwent VATS, decortication by CTS on 04/28/17.  Chronic gastritis with epigastric tenderness likely exacerbated by NSAID use, mild anemia.

## 2017-04-29 DIAGNOSIS — J18.9 PNEUMONIA, UNSPECIFIED ORGANISM: ICD-10-CM

## 2017-04-29 LAB
NIGHT BLUE STAIN TISS: SIGNIFICANT CHANGE UP
SPECIMEN SOURCE: SIGNIFICANT CHANGE UP

## 2017-04-29 PROCEDURE — 99233 SBSQ HOSP IP/OBS HIGH 50: CPT

## 2017-04-29 PROCEDURE — 71010: CPT | Mod: 26

## 2017-04-29 RX ADMIN — CEFTRIAXONE 100 GRAM(S): 500 INJECTION, POWDER, FOR SOLUTION INTRAMUSCULAR; INTRAVENOUS at 17:38

## 2017-04-29 RX ADMIN — Medication 3 MILLILITER(S): at 08:27

## 2017-04-29 RX ADMIN — Medication 3 MILLILITER(S): at 21:02

## 2017-04-29 RX ADMIN — Medication 30 MILLIGRAM(S): at 00:56

## 2017-04-29 RX ADMIN — FENTANYL CITRATE 30 MILLILITER(S): 50 INJECTION INTRAVENOUS at 07:44

## 2017-04-29 RX ADMIN — Medication 30 MILLILITER(S): at 01:03

## 2017-04-29 RX ADMIN — Medication 1 SPRAY(S): at 17:37

## 2017-04-29 RX ADMIN — Medication 100 MILLIGRAM(S): at 06:50

## 2017-04-29 RX ADMIN — Medication 3 MILLILITER(S): at 15:40

## 2017-04-29 RX ADMIN — Medication 40 MILLIGRAM(S): at 06:51

## 2017-04-29 RX ADMIN — PANTOPRAZOLE SODIUM 40 MILLIGRAM(S): 20 TABLET, DELAYED RELEASE ORAL at 06:51

## 2017-04-29 RX ADMIN — ENOXAPARIN SODIUM 40 MILLIGRAM(S): 100 INJECTION SUBCUTANEOUS at 13:29

## 2017-04-29 RX ADMIN — Medication 100 MILLIGRAM(S): at 23:08

## 2017-04-29 RX ADMIN — Medication 100 MILLIGRAM(S): at 15:48

## 2017-04-29 NOTE — PROGRESS NOTE ADULT - ASSESSMENT
57 y/o F 4/27 admitted to [facility] ER on 4/25/17 for c/o left sided chest pain for 3 days with fever & chills, dyspnea & non productive cough. Pt. had abnormal chest xray which indicated left pleural effusion & pneumonia. Pt. also presented with leukocytosis.  On 4/27 CT of the chest: large multiloculated  left hemithorax pleural effusion with residual aeration of the anterior segment of the left upper lobe without midline shift.  4/28 L VATS Decort/bronc    PLAN  OOB/ambulate  maintain CT today to suction  cont Rocephin for suspected PNA , d/c zithromax   follow up OR cultures, acid fast nedative   d/c PCA switch to PO medications  Regular diet , PPI  Lovenox for DVTP  plan to d/c Tubes possibly tomorrow  DW Dr Bejarano in am rounds

## 2017-04-29 NOTE — PROGRESS NOTE ADULT - SUBJECTIVE AND OBJECTIVE BOX
HOSPITALIST PROGRESS NOTE    JOCELYN VALLECILLO  [id number]  56yFemale    Patient is a 56y old  Female who presents with a chief complaint of left sided chest pain x 4 days (25 Apr 2017 16:20)      SUBJECTIVE:   Chart reviewed since last visit.  Patient seen and examined at bedside for pneumonia, multiloculated effusion.  Chest pain at tube site, mild cough  Denies dyspnea, chills or fevers.  Voided, passing flatus - no BM yet.        OBJECTIVE:  Vital Signs Last 24 Hrs  T(C): 36.9, Max: 37.1 (04-28 @ 17:36)  T(F): 98.4, Max: 98.7 (04-28 @ 17:36)  HR: 85 (74 - 85)  BP: 122/74 (97/59 - 122/74)  RR: 18 (13 - 24)  SpO2: 95% (95% - 100%)    PHYSICAL EXAMINATION  General: NAD[+]   nontoxic[+]   Sitting in Chair  HEENT: AT/NC[+]   NECK: Supple[+]    CVS: RRR[+]  S1+S2[+]    RESP: Fair air entry bilaterally[+]   wheeze[-]   Crackles and decreased breath sounds LLL (improved). Chest tubes x 2 left chest  GI: Soft[+]  Nondistended[+]     Bowel Sounds[+]    : suprapubic tenderness[-]     MS: FROM[-]   Edema[-]  CNS: AAOx3[-]      INTEG: Skin is Warm[+]  dry[]   PSYCH: Fair Mood, Affect    MONITOR - NSR    I&O's Summary    I & Os for current day (as of 29 Apr 2017 13:04)  =============================================  IN: 620 ml / OUT: 1122 ml / NET: -502 ml    Culture - Tissue with Gram Stain (04.28.17 @ 10:56)    Gram Stain:   No White blood cells  No organisms seen    Specimen Source: .Tissue Left plural peel    Culture Results:   No growth at 1 day.  Culture in progress    Culture - Blood (04.25.17 @ 05:55)    Specimen Source: .Blood Blood    Culture Results:   No growth at 48 hours              MEDICATIONS  (STANDING):  azithromycin  IVPB  IV Intermittent   cefTRIAXone   IVPB 1Gram(s) IV Intermittent every 24 hours  azithromycin  IVPB 500milliGRAM(s) IV Intermittent every 24 hours  Culture - Acid Fast - Tissue w/Smear (04.28.17 @ 22:45)    Specimen Source: .Tissue Left plural peel    Acid Fast Bacilli Smear:   No acid fast bacilli seen by fluorochrome stain    fluticasone propionate 50 MICROgram(s)/spray Nasal Spray 1Spray(s) Both Nostrils daily  docusate sodium 100milliGRAM(s) Oral three times a day  pantoprazole    Tablet 40milliGRAM(s) Oral before breakfast  ALBUTerol/ipratropium for Nebulization 3milliLiter(s) Nebulizer every 6 hours  enoxaparin Injectable 40milliGRAM(s) SubCutaneous daily      MEDICATIONS  (PRN):  guaiFENesin    Syrup 100milliGRAM(s) Oral every 6 hours PRN Cough  ketorolac   Injectable 30milliGRAM(s) IV Push every 6 hours PRN Moderate Pain (4 - 6)  acetaminophen   Tablet. 650milliGRAM(s) Oral every 6 hours PRN Mild Pain (1 - 3)  aluminum hydroxide/magnesium hydroxide/simethicone Suspension 30milliLiter(s) Oral every 4 hours PRN Dyspepsia  oxyCODONE  5 mG/acetaminophen 325 mG 1Tablet(s) Oral every 4 hours PRN Moderate Pain (4 - 6)  oxyCODONE  5 mG/acetaminophen 325 mG 2Tablet(s) Oral every 4 hours PRN Severe Pain (7 - 10)

## 2017-04-29 NOTE — PROGRESS NOTE ADULT - SUBJECTIVE AND OBJECTIVE BOX
Subjective: " I'm Ok"  Pt in bed NAD    T(C): 37.1, Max: 37.1 (04-28 @ 17:36)  HR: 85 (75 - 85)  BP: 122/56 (110/52 - 122/74)  RR: 18 (18 - 24)  SpO2: 95% (95% - 100%)  Tele: SR    CHEST TUBE:   A: 220 in 24 hours no airleak                          B: 125 in 24 hours no airleak                                                    CXR:    FINDINGS:    The lungs  show a partial clearing of left lung base airspace   consolidation. The left apical chest tube and left diaphragmatic chest   tube remain in place. No extrapleural air collection seen. Right lung   clear. No shift of midline structures. There is mild cardiomegaly.       Surgical sutures overlie left upper hemithorax.  The heart and mediastinum are within normal limits.         Visualized osseous structures are intact.        IMPRESSION:   Chest tubes in place. Resolving left lower lobe airspace   consolidation. No pneumothorax. Continued surveillance recommended..           Assessment  Neuro: alert awake   Pulm:  decreased b/l Left > Right   CV: RRR S1 S2   Abd: soft NT ND + BS  Extremities: no edema b/l LE           with PAST MEDICAL & SURGICAL HISTORY:  Chronic gastritis without bleeding, unspecified gastritis type  H/O right breast biopsy: States normal  History of

## 2017-04-30 DIAGNOSIS — R91.1 SOLITARY PULMONARY NODULE: ICD-10-CM

## 2017-04-30 DIAGNOSIS — R52 PAIN, UNSPECIFIED: ICD-10-CM

## 2017-04-30 DIAGNOSIS — K59.00 CONSTIPATION, UNSPECIFIED: ICD-10-CM

## 2017-04-30 LAB
ANION GAP SERPL CALC-SCNC: 11 MMOL/L — SIGNIFICANT CHANGE UP (ref 5–17)
BUN SERPL-MCNC: 21 MG/DL — HIGH (ref 8–20)
CALCIUM SERPL-MCNC: 8.6 MG/DL — SIGNIFICANT CHANGE UP (ref 8.6–10.2)
CHLORIDE SERPL-SCNC: 103 MMOL/L — SIGNIFICANT CHANGE UP (ref 98–107)
CK SERPL-CCNC: 28 U/L — SIGNIFICANT CHANGE UP (ref 25–170)
CO2 SERPL-SCNC: 27 MMOL/L — SIGNIFICANT CHANGE UP (ref 22–29)
CREAT SERPL-MCNC: 0.44 MG/DL — LOW (ref 0.5–1.3)
CULTURE RESULTS: SIGNIFICANT CHANGE UP
CULTURE RESULTS: SIGNIFICANT CHANGE UP
GLUCOSE SERPL-MCNC: 95 MG/DL — SIGNIFICANT CHANGE UP (ref 70–115)
HCT VFR BLD CALC: 29.6 % — LOW (ref 37–47)
HGB BLD-MCNC: 9.7 G/DL — LOW (ref 12–16)
MAGNESIUM SERPL-MCNC: 2.1 MG/DL — SIGNIFICANT CHANGE UP (ref 1.8–2.5)
MCHC RBC-ENTMCNC: 29.7 PG — SIGNIFICANT CHANGE UP (ref 27–31)
MCHC RBC-ENTMCNC: 32.8 G/DL — SIGNIFICANT CHANGE UP (ref 32–36)
MCV RBC AUTO: 90.5 FL — SIGNIFICANT CHANGE UP (ref 81–99)
PHOSPHATE SERPL-MCNC: 2.9 MG/DL — SIGNIFICANT CHANGE UP (ref 2.4–4.7)
PLATELET # BLD AUTO: 493 K/UL — HIGH (ref 150–400)
POTASSIUM SERPL-MCNC: 3.8 MMOL/L — SIGNIFICANT CHANGE UP (ref 3.5–5.3)
POTASSIUM SERPL-SCNC: 3.8 MMOL/L — SIGNIFICANT CHANGE UP (ref 3.5–5.3)
RBC # BLD: 3.27 M/UL — LOW (ref 4.4–5.2)
RBC # FLD: 13.5 % — SIGNIFICANT CHANGE UP (ref 11–15.6)
SODIUM SERPL-SCNC: 141 MMOL/L — SIGNIFICANT CHANGE UP (ref 135–145)
SPECIMEN SOURCE: SIGNIFICANT CHANGE UP
SPECIMEN SOURCE: SIGNIFICANT CHANGE UP
TROPONIN T SERPL-MCNC: <0.01 NG/ML — SIGNIFICANT CHANGE UP (ref 0–0.06)
WBC # BLD: 9.3 K/UL — SIGNIFICANT CHANGE UP (ref 4.8–10.8)
WBC # FLD AUTO: 9.3 K/UL — SIGNIFICANT CHANGE UP (ref 4.8–10.8)

## 2017-04-30 PROCEDURE — 93010 ELECTROCARDIOGRAM REPORT: CPT

## 2017-04-30 PROCEDURE — 71010: CPT | Mod: 26,77

## 2017-04-30 PROCEDURE — 71010: CPT | Mod: 26,76

## 2017-04-30 PROCEDURE — 99233 SBSQ HOSP IP/OBS HIGH 50: CPT

## 2017-04-30 RX ORDER — MULTIVIT-MIN/FERROUS GLUCONATE 9 MG/15 ML
1 LIQUID (ML) ORAL DAILY
Qty: 0 | Refills: 0 | Status: DISCONTINUED | OUTPATIENT
Start: 2017-04-30 | End: 2017-05-03

## 2017-04-30 RX ORDER — SENNA PLUS 8.6 MG/1
2 TABLET ORAL AT BEDTIME
Qty: 0 | Refills: 0 | Status: DISCONTINUED | OUTPATIENT
Start: 2017-04-30 | End: 2017-05-03

## 2017-04-30 RX ORDER — FERROUS SULFATE 325(65) MG
325 TABLET ORAL DAILY
Qty: 0 | Refills: 0 | Status: DISCONTINUED | OUTPATIENT
Start: 2017-04-30 | End: 2017-05-03

## 2017-04-30 RX ORDER — POLYETHYLENE GLYCOL 3350 17 G/17G
17 POWDER, FOR SOLUTION ORAL EVERY OTHER DAY
Qty: 0 | Refills: 0 | Status: DISCONTINUED | OUTPATIENT
Start: 2017-04-30 | End: 2017-05-03

## 2017-04-30 RX ORDER — FOLIC ACID 0.8 MG
1 TABLET ORAL DAILY
Qty: 0 | Refills: 0 | Status: DISCONTINUED | OUTPATIENT
Start: 2017-04-30 | End: 2017-05-03

## 2017-04-30 RX ADMIN — Medication 3 MILLILITER(S): at 03:43

## 2017-04-30 RX ADMIN — Medication 3 MILLILITER(S): at 08:32

## 2017-04-30 RX ADMIN — Medication 100 MILLIGRAM(S): at 21:25

## 2017-04-30 RX ADMIN — Medication 100 MILLIGRAM(S): at 13:51

## 2017-04-30 RX ADMIN — PANTOPRAZOLE SODIUM 40 MILLIGRAM(S): 20 TABLET, DELAYED RELEASE ORAL at 05:06

## 2017-04-30 RX ADMIN — Medication 3 MILLILITER(S): at 21:11

## 2017-04-30 RX ADMIN — ENOXAPARIN SODIUM 40 MILLIGRAM(S): 100 INJECTION SUBCUTANEOUS at 13:51

## 2017-04-30 RX ADMIN — CEFTRIAXONE 100 GRAM(S): 500 INJECTION, POWDER, FOR SOLUTION INTRAMUSCULAR; INTRAVENOUS at 13:52

## 2017-04-30 RX ADMIN — Medication 30 MILLIGRAM(S): at 09:32

## 2017-04-30 RX ADMIN — Medication 100 MILLIGRAM(S): at 05:06

## 2017-04-30 RX ADMIN — Medication 3 MILLILITER(S): at 14:18

## 2017-04-30 RX ADMIN — Medication 1 SPRAY(S): at 13:52

## 2017-04-30 RX ADMIN — SENNA PLUS 2 TABLET(S): 8.6 TABLET ORAL at 21:25

## 2017-04-30 RX ADMIN — Medication 30 MILLIGRAM(S): at 08:51

## 2017-04-30 NOTE — PROGRESS NOTE ADULT - SUBJECTIVE AND OBJECTIVE BOX
HOSPITALIST PROGRESS NOTE    JOCELYN VALLECILLO  [id number]  56yFemale    Patient is a 56y old  Female who presents with a chief complaint of left sided chest pain x 4 days (25 Apr 2017 16:20)      SUBJECTIVE:   Chart reviewed since last visit.  Patient seen and examined at bedside earlier today for CAP, multiloculated pleural effusion.  Feels better, denies any fever or chills.  Coughing more now. Mild chest pain.  No BM in 3 day,      OBJECTIVE:  Vital Signs Last 24 Hrs  T(C): 37.6, Max: 37.6 (04-30 @ 08:45)  T(F): 99.6, Max: 99.6 (04-30 @ 08:45)  HR: 82 (82 - 92)  BP: 124/72 (106/50 - 128/60)  RR: 18 (18 - 18)  SpO2: 100% (96% - 100%)    PHYSICAL EXAMINATION  General: NAD[+]   nontoxic[+]   Sitting in Chair  HEENT: AT/NC[+]   NECK: Supple[+]    CVS: RRR[+]  S1+S2[+]    RESP: Fair air entry bilaterally[+]   wheeze[-]   Crackles and decreased breath sounds LLL (improved). Chest tubes x 2 left chest  GI: Soft[+]  Nondistended[+]     Bowel Sounds[+]    : suprapubic tenderness[-]     MS: FROM[-]   Edema[-]  CNS: AAOx3[-]      INTEG: Skin is Warm[+]  dry[]   PSYCH: Fair Mood, Affect    MONITOR:    I&O's Summary  I & Os for 24h ending 30 Apr 2017 07:00  =============================================  IN: 600 ml / OUT: 490 ml / NET: 110 ml    I & Os for current day (as of 30 Apr 2017 13:56)  =============================================  IN: 120 ml / OUT: 400 ml / NET: -280 ml                          9.7    9.3   )-----------( 493      ( 30 Apr 2017 07:18 )             29.6       04-30    141  |  103  |  21.0<H>  ----------------------------<  95  3.8   |  27.0  |  0.44<L>    Ca    8.6      30 Apr 2017 07:18  Phos  2.9     04-30  Mg     2.1     04-30      CARDIAC MARKERS ( 30 Apr 2017 09:35 )  x     / <0.01 ng/mL / 28 U/L / x     / x          EXAM:  CHEST SINGLE VIEW FRONTAL                          PROCEDURE DATE:  04/30/2017        INTERPRETATION:  Portable chest radiograph        CLINICAL INFORMATION:   Chest pain. Shortness of breath. Congestive heart   failure. Follow-up    TECHNIQUE:  Portable  AP view of the chest was obtained.    COMPARISON: 4/29/2017 available for review.    FINDINGS: Left chest tube tip overlies apex of lung and a second chest   tube overlies diaphragm.  The lungs  show residual left lower lobe retrocardiac airspace   consolidation with air bronchograms. Left upper lobe remains clear. Right   lung clear.         The  heart is enlarged in transverse diameter. No hilar mass. Trachea   midline.        Visualized osseous structures are intact.        IMPRESSION:   Residual left lower hemithorax airspace consolidation..         LAXMI LANE M.D., ATTENDING RADIOLOGIST  This document has been electronically signed. Apr 30 2017  1:54PM        MEDICATIONS  (STANDING):  cefTRIAXone   IVPB 1Gram(s) IV Intermittent every 24 hours  fluticasone propionate 50 MICROgram(s)/spray Nasal Spray 1Spray(s) Both Nostrils daily  docusate sodium 100milliGRAM(s) Oral three times a day  pantoprazole    Tablet 40milliGRAM(s) Oral before breakfast  ALBUTerol/ipratropium for Nebulization 3milliLiter(s) Nebulizer every 6 hours  enoxaparin Injectable 40milliGRAM(s) SubCutaneous daily  senna 2Tablet(s) Oral at bedtime      MEDICATIONS  (PRN):  guaiFENesin    Syrup 100milliGRAM(s) Oral every 6 hours PRN Cough  ketorolac   Injectable 30milliGRAM(s) IV Push every 6 hours PRN Moderate Pain (4 - 6)  acetaminophen   Tablet. 650milliGRAM(s) Oral every 6 hours PRN Mild Pain (1 - 3)  aluminum hydroxide/magnesium hydroxide/simethicone Suspension 30milliLiter(s) Oral every 4 hours PRN Dyspepsia  oxyCODONE  5 mG/acetaminophen 325 mG 1Tablet(s) Oral every 4 hours PRN Moderate Pain (4 - 6)  oxyCODONE  5 mG/acetaminophen 325 mG 2Tablet(s) Oral every 4 hours PRN Severe Pain (7 - 10)  polyethylene glycol 3350 17Gram(s) Oral every other day PRN Constipation

## 2017-04-30 NOTE — PROGRESS NOTE ADULT - SUBJECTIVE AND OBJECTIVE BOX
Subjective: "I feel ok.  I just have pain in the middle of my chest.  I think it's from these tubes."  Sitting up in bed.  Denies SOB or CP.  NAD noted.      Tele: SR                     T(F): 99.6, Max: 99.6 ( @ 08:45)  HR: 82 (82 - 92)  BP: 124/72 (106/50 - 128/60)  RR: 18 (18 - 18)  SpO2: 100% (95% - 100%) on room air.          Daily     Daily Weight in k.4 (2017 05:07)      No Known Allergies          141  |  103  |  21.0<H>  ----------------------------<  95  3.8   |  27.0  |  0.44<L>    Ca    8.6      2017 07:18  Phos  2.9       Mg     2.1                                      9.7    9.3   )-----------( 493      ( 2017 07:18 )             29.6                         CXR:  LLL atelectasis.    I&O's Detail  I & Os for 24h ending 2017 07:00  =============================================  IN:    Oral Fluid: 600 ml    Total IN: 600 ml  ---------------------------------------------  OUT:    Voided: 400 ml    Chest Tube: 70 ml    Chest Tube: 20 ml    Total OUT: 490 ml  ---------------------------------------------  Total NET: 110 ml    I & Os for current day (as of 2017 13:14)  =============================================  IN:    Oral Fluid: 120 ml    Total IN: 120 ml  ---------------------------------------------  OUT:    Voided: 400 ml    Total OUT: 400 ml  ---------------------------------------------  Total NET: -280 ml      CHEST TUBE:  [x ] YES [ ] NO  OUTPUT: Chest tube "A"  0ml overnight and 20ml over the last 24 hours.     AIR LEAKS:  [ ] YES [x ] NO                                                                  Chest Tube "B"  50ml overnight and 70ml over the last 24 hours.                                                                     Active Medications:  guaiFENesin    Syrup 100milliGRAM(s) Oral every 6 hours PRN  cefTRIAXone   IVPB 1Gram(s) IV Intermittent every 24 hours  fluticasone propionate 50 MICROgram(s)/spray Nasal Spray 1Spray(s) Both Nostrils daily  docusate sodium 100milliGRAM(s) Oral three times a day  pantoprazole    Tablet 40milliGRAM(s) Oral before breakfast  ketorolac   Injectable 30milliGRAM(s) IV Push every 6 hours PRN  acetaminophen   Tablet. 650milliGRAM(s) Oral every 6 hours PRN  ALBUTerol/ipratropium for Nebulization 3milliLiter(s) Nebulizer every 6 hours  enoxaparin Injectable 40milliGRAM(s) SubCutaneous daily  aluminum hydroxide/magnesium hydroxide/simethicone Suspension 30milliLiter(s) Oral every 4 hours PRN  oxyCODONE  5 mG/acetaminophen 325 mG 1Tablet(s) Oral every 4 hours PRN  oxyCODONE  5 mG/acetaminophen 325 mG 2Tablet(s) Oral every 4 hours PRN      Physical Exam:    Neuro: AAOX3.  No focal deficits.    Pulm: Diminished BLL.  Left chest tubes x2.  No air leaks and no crepitus.  Reproducible muscular pain to left side and mid chest.    CV: RRR.  +S1+S2.    Abd: Soft/NT/ND.  +BS.  +Flatus.  - BM.    Extremities:  No edema BLE.  +pp.    Incision(s): Left VATS incision LUKE and without erythema or drainage.                       PAST MEDICAL & SURGICAL HISTORY:  Chronic gastritis without bleeding, unspecified gastritis type  H/O right breast biopsy: States normal  History of

## 2017-04-30 NOTE — PROGRESS NOTE ADULT - SUBJECTIVE AND OBJECTIVE BOX
Chest tube B removed.  Dry sterile occlusive dressing placed. Follow up chest xray pending.  Transferred pt to Medicine service as per Dr. Bejarano.  Spoke to Dr. Long earlier today and he is aware.  Will continue to follow along.

## 2017-04-30 NOTE — PROGRESS NOTE ADULT - SUBJECTIVE AND OBJECTIVE BOX
Chest Tube A removed.  Dry sterile occlusive dressing applied.  Follow up chest xray done and no pneumothorax was noted.

## 2017-04-30 NOTE — PROGRESS NOTE ADULT - ASSESSMENT
55 y/o F 4/27 admitted to [facility] ER on 4/25/17 for c/o left sided chest pain for 3 days with fever & chills, dyspnea & non productive cough. Pt. had abnormal chest xray which indicated left pleural effusion & pneumonia. Pt. also presented with leukocytosis.  On 4/27 CT of the chest: large multiloculated  left hemithorax pleural effusion with residual aeration of the anterior segment of the left upper lobe without midline shift.  4/28 L VATS Decort/bronc

## 2017-04-30 NOTE — PROGRESS NOTE ADULT - ASSESSMENT
56 year old female with chronic gastritis presents with left sided chest pain, fever, leukocytosis with left shift and Chest X-Ray consistent with left lung CAP (Community acquired pneumonia). She doesn't seem to have overt sepsis. Given severity of leukocytosis, relative hypoxia and relative inability to follow up with PMD if she is sent home on PO antibiotics increases her risk despite PSI of II.  Continues to have symptoms, worsening Chest X-Ray. CTscan Chest (04/27/17)with Large multiloculated  left hemithorax pleural effusion with residual aeration of the anterior segment of the left upper lobe without midline shift. Underwent VATS, decortication by CTS on 04/28/17. Chest tubes x 2 on left. mild post-operative anemia, asymptomatic. Intraoperative cultures negative to date.    Chronic gastritis with epigastric tenderness likely exacerbated by NSAID use, mild anemia.

## 2017-05-01 DIAGNOSIS — J18.9 PNEUMONIA, UNSPECIFIED ORGANISM: ICD-10-CM

## 2017-05-01 PROCEDURE — 99233 SBSQ HOSP IP/OBS HIGH 50: CPT

## 2017-05-01 PROCEDURE — 71010: CPT | Mod: 26

## 2017-05-01 RX ADMIN — Medication 325 MILLIGRAM(S): at 12:33

## 2017-05-01 RX ADMIN — ENOXAPARIN SODIUM 40 MILLIGRAM(S): 100 INJECTION SUBCUTANEOUS at 12:33

## 2017-05-01 RX ADMIN — Medication 1 SPRAY(S): at 12:33

## 2017-05-01 RX ADMIN — PANTOPRAZOLE SODIUM 40 MILLIGRAM(S): 20 TABLET, DELAYED RELEASE ORAL at 06:21

## 2017-05-01 RX ADMIN — Medication 3 MILLILITER(S): at 03:17

## 2017-05-01 RX ADMIN — Medication 1 MILLIGRAM(S): at 12:36

## 2017-05-01 RX ADMIN — Medication 100 MILLIGRAM(S): at 12:33

## 2017-05-01 RX ADMIN — Medication 30 MILLIGRAM(S): at 06:21

## 2017-05-01 RX ADMIN — Medication 100 MILLIGRAM(S): at 06:21

## 2017-05-01 RX ADMIN — CEFTRIAXONE 100 GRAM(S): 500 INJECTION, POWDER, FOR SOLUTION INTRAMUSCULAR; INTRAVENOUS at 13:24

## 2017-05-01 RX ADMIN — Medication 100 MILLIGRAM(S): at 19:40

## 2017-05-01 RX ADMIN — Medication 1 TABLET(S): at 17:04

## 2017-05-01 RX ADMIN — Medication 3 MILLILITER(S): at 14:57

## 2017-05-01 RX ADMIN — Medication 3 MILLILITER(S): at 08:26

## 2017-05-01 RX ADMIN — Medication 30 MILLIGRAM(S): at 06:40

## 2017-05-01 NOTE — PROGRESS NOTE ADULT - ASSESSMENT
CAP, complicated parapneumonic eff, post VATs/decortication  clinically much improved  WBC normal  c/s negative  CXR improved  mobilize  T surg f/u  pulmonary f/u as out pt CAP, complicated parapneumonic eff, post VATs/decortication  clinically much improved  WBC normal  c/s negative  CXR improved, finish abx, probiotic  mobilize  T surg f/u  pulmonary f/u as out pt

## 2017-05-01 NOTE — PROGRESS NOTE ADULT - SUBJECTIVE AND OBJECTIVE BOX
HOSPITALIST PROGRESS NOTE    JOCELYN VALLECILLO  [id number]  56yFemale    Patient is a 56y old  Female who presents with a chief complaint of left sided chest pain x 4 days (25 Apr 2017 16:20)      SUBJECTIVE:   Chart reviewed since last visit.  Chest tubes removed yesterday afternoon.  Patient seen and examined at bedside earlier today for CAP, left empyema.  Continues to feel better.  Less chest pain, dyspnea and cough.  Occasional chills.        OBJECTIVE:  Vital Signs Last 24 Hrs  T(C): 36.9, Max: 37 (05-01 @ 06:10)  T(F): 98.4, Max: 98.6 (05-01 @ 06:10)  HR: 78 (78 - 85)  BP: 110/60 (108/58 - 116/60)  RR: 18 (18 - 18)  SpO2: 98% (98% - 100%)    PHYSICAL EXAMINATION  General: NAD[+]   nontoxic[+]   Sitting in Chair  HEENT: AT/NC[+]   NECK: Supple[+]    CVS: RRR[+]  S1+S2[+]    RESP: Fair air entry bilaterally[+]   wheeze[-]   Crackles and decreased breath sounds LLL (improved).   GI: Soft[+]  Nondistended[+]     Bowel Sounds[+]    : suprapubic tenderness[-]     MS: FROM[-]   Edema[-]  CNS: AAOx3[-]      INTEG: Skin is Warm[+]  dry[]   PSYCH: Fair Mood, Affect        I&O's Summary  I & Os for 24h ending 01 May 2017 07:00  =============================================  IN: 120 ml / OUT: 400 ml / NET: -280 ml    I & Os for current day (as of 01 May 2017 13:31)  =============================================  IN: 240 ml / OUT: 400 ml / NET: -160 ml                          9.7    8.8   )-----------( 468      ( 01 May 2017 06:51 )             29.6       05-01    137  |  100  |  15.0  ----------------------------<  90  4.0   |  27.0  |  0.37<L>    Ca    8.4<L>      01 May 2017 06:51  Phos  3.4     05-01  Mg     2.0     05-01      CARDIAC MARKERS ( 30 Apr 2017 09:35 )  x     / <0.01 ng/mL / 28 U/L / x     / x                MEDICATIONS  (STANDING):  cefTRIAXone   IVPB 1Gram(s) IV Intermittent every 24 hours  fluticasone propionate 50 MICROgram(s)/spray Nasal Spray 1Spray(s) Both Nostrils daily  docusate sodium 100milliGRAM(s) Oral three times a day  pantoprazole    Tablet 40milliGRAM(s) Oral before breakfast  ALBUTerol/ipratropium for Nebulization 3milliLiter(s) Nebulizer every 6 hours  enoxaparin Injectable 40milliGRAM(s) SubCutaneous daily  senna 2Tablet(s) Oral at bedtime  ferrous    sulfate 325milliGRAM(s) Oral daily  folic acid 1milliGRAM(s) Oral daily  multivitamin/minerals 1Tablet(s) Oral daily      MEDICATIONS  (PRN):  guaiFENesin    Syrup 100milliGRAM(s) Oral every 6 hours PRN Cough  ketorolac   Injectable 30milliGRAM(s) IV Push every 6 hours PRN Moderate Pain (4 - 6)  acetaminophen   Tablet. 650milliGRAM(s) Oral every 6 hours PRN Mild Pain (1 - 3)  aluminum hydroxide/magnesium hydroxide/simethicone Suspension 30milliLiter(s) Oral every 4 hours PRN Dyspepsia  oxyCODONE  5 mG/acetaminophen 325 mG 1Tablet(s) Oral every 4 hours PRN Moderate Pain (4 - 6)  oxyCODONE  5 mG/acetaminophen 325 mG 2Tablet(s) Oral every 4 hours PRN Severe Pain (7 - 10)  polyethylene glycol 3350 17Gram(s) Oral every other day PRN Constipation HOSPITALIST PROGRESS NOTE    JOCELYN VALLECILLO  [id number]  56yFemale    Patient is a 56y old  Female who presents with a chief complaint of left sided chest pain x 4 days (25 Apr 2017 16:20)      SUBJECTIVE:   Chart reviewed since last visit.  Chest tubes removed yesterday afternoon.  Patient seen and examined at bedside earlier today for CAP, left empyema.  Continues to feel better.  Less chest pain, dyspnea and cough.  Occasional chills.        OBJECTIVE:  Vital Signs Last 24 Hrs  T(C): 36.9, Max: 37 (05-01 @ 06:10)  T(F): 98.4, Max: 98.6 (05-01 @ 06:10)  HR: 78 (78 - 85)  BP: 110/60 (108/58 - 116/60)  RR: 18 (18 - 18)  SpO2: 98% (98% - 100%)    PHYSICAL EXAMINATION  General: NAD[+]   nontoxic[+]   Sitting in Chair  HEENT: AT/NC[+]   NECK: Supple[+]    CVS: RRR[+]  S1+S2[+]    RESP: Fair air entry bilaterally[+]   wheeze[-]   Crackles and decreased breath sounds LLL (improved).   GI: Soft[+]  Nondistended[+]     Bowel Sounds[+]    : suprapubic tenderness[-]     MS: FROM[-]   Edema[-]  CNS: AAOx3[-]      INTEG: Skin is Warm[+]  dry[]   PSYCH: Fair Mood, Affect        I&O's Summary  I & Os for 24h ending 01 May 2017 07:00  =============================================  IN: 120 ml / OUT: 400 ml / NET: -280 ml    I & Os for current day (as of 01 May 2017 13:31)  =============================================  IN: 240 ml / OUT: 400 ml / NET: -160 ml                          9.7    8.8   )-----------( 468      ( 01 May 2017 06:51 )             29.6       05-01    137  |  100  |  15.0  ----------------------------<  90  4.0   |  27.0  |  0.37<L>    Ca    8.4<L>      01 May 2017 06:51  Phos  3.4     05-01  Mg     2.0     05-01        EXAM:  CHEST SINGLE VIEW FRONTAL                          PROCEDURE DATE:  05/01/2017        INTERPRETATION:  CHEST AP PORTABLE:    History: VATS.     Date and time of exam: 5/1/2017 3:52 AM.    Technique: A single AP view of the chest was obtained.    Comparison exam: 4/30/2017 5:04 PM.    Findings:  Persistent left pleural effusion and left basilar atelectasis unchanged   since the prior day. The right lung remains clear..    Impression:  Stable exam without significant change since the previous study..                TAL SMALLS M.D., ATTENDING RADIOLOGIST  This document has been electronically signed. May  1 2017  8:49AM        MEDICATIONS  (STANDING):  cefTRIAXone   IVPB 1Gram(s) IV Intermittent every 24 hours  fluticasone propionate 50 MICROgram(s)/spray Nasal Spray 1Spray(s) Both Nostrils daily  docusate sodium 100milliGRAM(s) Oral three times a day  pantoprazole    Tablet 40milliGRAM(s) Oral before breakfast  ALBUTerol/ipratropium for Nebulization 3milliLiter(s) Nebulizer every 6 hours  enoxaparin Injectable 40milliGRAM(s) SubCutaneous daily  senna 2Tablet(s) Oral at bedtime  ferrous    sulfate 325milliGRAM(s) Oral daily  folic acid 1milliGRAM(s) Oral daily  multivitamin/minerals 1Tablet(s) Oral daily      MEDICATIONS  (PRN):  guaiFENesin    Syrup 100milliGRAM(s) Oral every 6 hours PRN Cough  ketorolac   Injectable 30milliGRAM(s) IV Push every 6 hours PRN Moderate Pain (4 - 6)  acetaminophen   Tablet. 650milliGRAM(s) Oral every 6 hours PRN Mild Pain (1 - 3)  aluminum hydroxide/magnesium hydroxide/simethicone Suspension 30milliLiter(s) Oral every 4 hours PRN Dyspepsia  oxyCODONE  5 mG/acetaminophen 325 mG 1Tablet(s) Oral every 4 hours PRN Moderate Pain (4 - 6)  oxyCODONE  5 mG/acetaminophen 325 mG 2Tablet(s) Oral every 4 hours PRN Severe Pain (7 - 10)  polyethylene glycol 3350 17Gram(s) Oral every other day PRN Constipation

## 2017-05-01 NOTE — PROGRESS NOTE ADULT - SUBJECTIVE AND OBJECTIVE BOX
PULMONARY PROGRESS NOTE      GIOVANNA VALLECILLO-[id number]    Patient is a 56y old  Female who presents with a chief complaint of left sided chest pain x 4 days (2017 16:20)      INTERVAL HPI/OVERNIGHT EVENTS:  feels better overall  less pain  MEDICATIONS  (STANDING):  cefTRIAXone   IVPB 1Gram(s) IV Intermittent every 24 hours  fluticasone propionate 50 MICROgram(s)/spray Nasal Spray 1Spray(s) Both Nostrils daily  docusate sodium 100milliGRAM(s) Oral three times a day  pantoprazole    Tablet 40milliGRAM(s) Oral before breakfast  ALBUTerol/ipratropium for Nebulization 3milliLiter(s) Nebulizer every 6 hours  enoxaparin Injectable 40milliGRAM(s) SubCutaneous daily  senna 2Tablet(s) Oral at bedtime  ferrous    sulfate 325milliGRAM(s) Oral daily  folic acid 1milliGRAM(s) Oral daily  multivitamin/minerals 1Tablet(s) Oral daily      MEDICATIONS  (PRN):  guaiFENesin    Syrup 100milliGRAM(s) Oral every 6 hours PRN Cough  ketorolac   Injectable 30milliGRAM(s) IV Push every 6 hours PRN Moderate Pain (4 - 6)  acetaminophen   Tablet. 650milliGRAM(s) Oral every 6 hours PRN Mild Pain (1 - 3)  aluminum hydroxide/magnesium hydroxide/simethicone Suspension 30milliLiter(s) Oral every 4 hours PRN Dyspepsia  oxyCODONE  5 mG/acetaminophen 325 mG 1Tablet(s) Oral every 4 hours PRN Moderate Pain (4 - 6)  oxyCODONE  5 mG/acetaminophen 325 mG 2Tablet(s) Oral every 4 hours PRN Severe Pain (7 - 10)  polyethylene glycol 3350 17Gram(s) Oral every other day PRN Constipation      Allergies    No Known Allergies    Intolerances        PAST MEDICAL & SURGICAL HISTORY:  Chronic gastritis without bleeding, unspecified gastritis type  H/O right breast biopsy: States normal  History of       SOCIAL HISTORY  Smoking History:       REVIEW OF SYSTEMS:    CONSTITUTIONAL:  No distress    HEENT:  Eyes:  No diplopia or blurred vision. ENT:  No earache, sore throat or runny nose.    CARDIOVASCULAR:  No pressure, squeezing, tightness, heaviness or aching about the chest; no palpitations.    RESPIRATORY:  see hpi  GASTROINTESTINAL:  No nausea, vomiting or diarrhea.    GENITOURINARY:  No dysuria, frequency or urgency.    NEUROLOGIC:  No paresthesias, fasciculations, seizures or weakness.    PSYCHIATRIC:  No disorder of thought or mood.    Vital Signs Last 24 Hrs  T(C): 37, Max: 37.2 (-30 @ 13:18)  T(F): 98.6, Max: 98.9 (04-30 @ 13:18)  HR: 82 (82 - 85)  BP: 108/58 (108/58 - 122/68)  BP(mean): --  RR: 18 (18 - 18)  SpO2: 100% (100% - 100%)    PHYSICAL EXAMINATION:    GENERAL: The patient is awake and alert in no apparent distress.     HEENT: Head is normocephalic and atraumatic. Extraocular muscles are intact. Mucous membranes are moist.    NECK: Supple.    LUNGS: moderate air entry bilat, decr l base respirations unlabored    HEART: Regular rate and rhythm without murmur.    ABDOMEN: Soft, nontender, and nondistended.      EXTREMITIES: Without any cyanosis, clubbing, rash, lesions or edema.    NEUROLOGIC: Grossly intact.    LABS:                        9.7    8.8   )-----------( 468      ( 01 May 2017 06:51 )             29.6     05-01    137  |  100  |  15.0  ----------------------------<  90  4.0   |  27.0  |  0.37<L>    Ca    8.4<L>      01 May 2017 06:51  Phos  3.4     05-  Mg     2.0     05-01            CARDIAC MARKERS ( 2017 09:35 )  x     / <0.01 ng/mL / 28 U/L / x     / x                    MICROBIOLOGY:    RADIOLOGY & ADDITIONAL STUDIES:  CXR reviewed and compared , CT scan reviewed

## 2017-05-01 NOTE — PROGRESS NOTE ADULT - ASSESSMENT
56 year old female with chronic gastritis presents with left sided chest pain, fever, leukocytosis with left shift and Chest X-Ray consistent with left lung CAP (Community acquired pneumonia). She doesn't seem to have overt sepsis. Given severity of leukocytosis, relative hypoxia and relative inability to follow up with PMD if she is sent home on PO antibiotics increases her risk despite PSI of II.  Continues to have symptoms, worsening Chest X-Ray. CTscan Chest (04/27/17)with Large multiloculated  left hemithorax pleural effusion with residual aeration of the anterior segment of the left upper lobe without midline shift. Underwent VATS, decortication by CTS on 04/28/17. Chest tubes x 2 on left removed 04/30/17. Mild post-operative anemia, asymptomatic. Intraoperative cultures negative to date. Leukocytosis resolved Remains afebrile    Chronic gastritis with epigastric tenderness likely exacerbated by NSAID use, mild anemia. 56 year old female with chronic gastritis presents with left sided chest pain, fever, leukocytosis with left shift and Chest X-Ray consistent with left lung CAP (Community acquired pneumonia). She doesn't seem to have overt sepsis. Given severity of leukocytosis, relative hypoxia and relative inability to follow up with PMD if she is sent home on PO antibiotics increases her risk despite PSI of II.  Initially with persistent symptoms, worsening Chest X-Ray. CTscan Chest (04/27/17)with Large multiloculated  left hemithorax pleural effusion with residual aeration of the anterior segment of the left upper lobe without midline shift. Underwent VATS, decortication by CTS on 04/28/17. Chest tubes x 2 on left removed 04/30/17. Mild post-operative anemia, asymptomatic. Intraoperative cultures negative to date. Leukocytosis resolved. Remains afebrile. Now continuing to improve    Chronic gastritis with epigastric tenderness likely exacerbated by NSAID use, mild anemia.

## 2017-05-02 PROCEDURE — 99232 SBSQ HOSP IP/OBS MODERATE 35: CPT

## 2017-05-02 RX ADMIN — Medication 3 MILLILITER(S): at 15:50

## 2017-05-02 RX ADMIN — Medication 3 MILLILITER(S): at 22:04

## 2017-05-02 RX ADMIN — Medication 325 MILLIGRAM(S): at 11:06

## 2017-05-02 RX ADMIN — Medication 1 SPRAY(S): at 11:06

## 2017-05-02 RX ADMIN — Medication 100 MILLIGRAM(S): at 15:29

## 2017-05-02 RX ADMIN — Medication 1 MILLIGRAM(S): at 11:06

## 2017-05-02 RX ADMIN — PANTOPRAZOLE SODIUM 40 MILLIGRAM(S): 20 TABLET, DELAYED RELEASE ORAL at 05:04

## 2017-05-02 RX ADMIN — SENNA PLUS 2 TABLET(S): 8.6 TABLET ORAL at 21:37

## 2017-05-02 RX ADMIN — Medication 100 MILLIGRAM(S): at 05:04

## 2017-05-02 RX ADMIN — Medication 3 MILLILITER(S): at 09:02

## 2017-05-02 RX ADMIN — Medication 100 MILLIGRAM(S): at 21:37

## 2017-05-02 RX ADMIN — Medication 1 TABLET(S): at 11:06

## 2017-05-02 NOTE — PROGRESS NOTE ADULT - SUBJECTIVE AND OBJECTIVE BOX
HOSPITALIST PROGRESS NOTE    JOCELYN VALLECILLO  [id number]  56yFemale    Patient is a 56y old  Female who presents with a chief complaint of left sided chest pain x 4 days (25 Apr 2017 16:20)      SUBJECTIVE:   Chart reviewed since last visit.  Patient seen and examined at bedside earlier for CAP, empyema.  Complaining of central chestpain (discussed with RN - refusing pain medications)  Mild dyspnea (exertional) and persistent cough- non-productive.  Denies any fevers or chills.  Ambulating, had BM, voiding freely        OBJECTIVE:  Vital Signs Last 24 Hrs  T(C): 36.4, Max: 36.9 (05-01 @ 16:43)  T(F): 97.6, Max: 98.5 (05-01 @ 16:43)  HR: 85 (74 - 85)  BP: 114/58 (112/54 - 126/60)  RR: 16 (16 - 18)  SpO2: 94% (91% - 100%)      PHYSICAL EXAMINATION  General: NAD[+]   nontoxic[+]   Sitting in Chair  HEENT: AT/NC[+]   NECK: Supple[+]    CVS: RRR[+]  S1+S2[+]    RESP: Fair air entry bilaterally[+]   wheeze[-]   Crackles and decreased breath sounds LLL (improved). Surgical dressing C/D/I  GI: Soft[+]  Nondistended[+]     Bowel Sounds[+]    : suprapubic tenderness[-]     MS: FROM[-]   Edema[-]  CNS: AAOx3[-]      INTEG: Skin is Warm[+]  dry[]   PSYCH: Fair Mood, Affect    Monitor - SR.                         9.7    8.8   )-----------( 468      ( 01 May 2017 06:51 )             29.6         MEDICATIONS  (STANDING):  fluticasone propionate 50 MICROgram(s)/spray Nasal Spray 1Spray(s) Both Nostrils daily  docusate sodium 100milliGRAM(s) Oral three times a day  pantoprazole    Tablet 40milliGRAM(s) Oral before breakfast  ALBUTerol/ipratropium for Nebulization 3milliLiter(s) Nebulizer every 6 hours  enoxaparin Injectable 40milliGRAM(s) SubCutaneous daily  senna 2Tablet(s) Oral at bedtime  ferrous    sulfate 325milliGRAM(s) Oral daily  folic acid 1milliGRAM(s) Oral daily  multivitamin/minerals 1Tablet(s) Oral daily  levoFLOXacin  Tablet 500milliGRAM(s) Oral every 24 hours      MEDICATIONS  (PRN):  guaiFENesin    Syrup 100milliGRAM(s) Oral every 6 hours PRN Cough  ketorolac   Injectable 30milliGRAM(s) IV Push every 6 hours PRN Moderate Pain (4 - 6)  acetaminophen   Tablet. 650milliGRAM(s) Oral every 6 hours PRN Mild Pain (1 - 3)  aluminum hydroxide/magnesium hydroxide/simethicone Suspension 30milliLiter(s) Oral every 4 hours PRN Dyspepsia  oxyCODONE  5 mG/acetaminophen 325 mG 1Tablet(s) Oral every 4 hours PRN Moderate Pain (4 - 6)  oxyCODONE  5 mG/acetaminophen 325 mG 2Tablet(s) Oral every 4 hours PRN Severe Pain (7 - 10)  polyethylene glycol 3350 17Gram(s) Oral every other day PRN Constipation

## 2017-05-02 NOTE — PROGRESS NOTE ADULT - ASSESSMENT
56 year old female with chronic gastritis presents with left sided chest pain, fever, leukocytosis with left shift and Chest X-Ray consistent with left lung CAP (Community acquired pneumonia). She doesn't seem to have overt sepsis. Given severity of leukocytosis, relative hypoxia and relative inability to follow up with PMD if she is sent home on PO antibiotics increases her risk despite PSI of II.  Initially with persistent symptoms, worsening Chest X-Ray. CTscan Chest (04/27/17)with Large multiloculated  left hemithorax pleural effusion with residual aeration of the anterior segment of the left upper lobe without midline shift. Underwent VATS, decortication by CTS on 04/28/17. Chest tubes x 2 on left removed 04/30/17. Mild post-operative anemia, asymptomatic. Intraoperative cultures negative to date. Leukocytosis resolved. Remains afebrile. Now continuing to improve. Still with some exertional symptoms and borderline hypoxia    Chronic gastritis with epigastric tenderness likely exacerbated by NSAID use, mild anemia.

## 2017-05-02 NOTE — PHYSICAL THERAPY INITIAL EVALUATION ADULT - ADDITIONAL COMMENTS
Pt reports living with spouse on 2nd floor of 2 family home. 3 steps to enter, full flight to residence with rail. Independent at baseline, no device.

## 2017-05-03 ENCOUNTER — TRANSCRIPTION ENCOUNTER (OUTPATIENT)
Age: 57
End: 2017-05-03

## 2017-05-03 VITALS — SYSTOLIC BLOOD PRESSURE: 125 MMHG | TEMPERATURE: 98 F | HEART RATE: 85 BPM | DIASTOLIC BLOOD PRESSURE: 60 MMHG

## 2017-05-03 LAB
CULTURE RESULTS: SIGNIFICANT CHANGE UP
CULTURE RESULTS: SIGNIFICANT CHANGE UP
SPECIMEN SOURCE: SIGNIFICANT CHANGE UP
SPECIMEN SOURCE: SIGNIFICANT CHANGE UP
SURGICAL PATHOLOGY FINAL REPORT - CH: SIGNIFICANT CHANGE UP

## 2017-05-03 PROCEDURE — 71045 X-RAY EXAM CHEST 1 VIEW: CPT

## 2017-05-03 PROCEDURE — 86920 COMPATIBILITY TEST SPIN: CPT

## 2017-05-03 PROCEDURE — 83690 ASSAY OF LIPASE: CPT

## 2017-05-03 PROCEDURE — 87449 NOS EACH ORGANISM AG IA: CPT

## 2017-05-03 PROCEDURE — 87581 M.PNEUMON DNA AMP PROBE: CPT

## 2017-05-03 PROCEDURE — 71046 X-RAY EXAM CHEST 2 VIEWS: CPT

## 2017-05-03 PROCEDURE — 80048 BASIC METABOLIC PNL TOTAL CA: CPT

## 2017-05-03 PROCEDURE — 80053 COMPREHEN METABOLIC PANEL: CPT

## 2017-05-03 PROCEDURE — 36430 TRANSFUSION BLD/BLD COMPNT: CPT

## 2017-05-03 PROCEDURE — 94760 N-INVAS EAR/PLS OXIMETRY 1: CPT

## 2017-05-03 PROCEDURE — 84100 ASSAY OF PHOSPHORUS: CPT

## 2017-05-03 PROCEDURE — 88305 TISSUE EXAM BY PATHOLOGIST: CPT

## 2017-05-03 PROCEDURE — 87798 DETECT AGENT NOS DNA AMP: CPT

## 2017-05-03 PROCEDURE — 81001 URINALYSIS AUTO W/SCOPE: CPT

## 2017-05-03 PROCEDURE — 99239 HOSP IP/OBS DSCHRG MGMT >30: CPT

## 2017-05-03 PROCEDURE — 36415 COLL VENOUS BLD VENIPUNCTURE: CPT

## 2017-05-03 PROCEDURE — 87116 MYCOBACTERIA CULTURE: CPT

## 2017-05-03 PROCEDURE — 86850 RBC ANTIBODY SCREEN: CPT

## 2017-05-03 PROCEDURE — 71250 CT THORAX DX C-: CPT

## 2017-05-03 PROCEDURE — 85027 COMPLETE CBC AUTOMATED: CPT

## 2017-05-03 PROCEDURE — T1013: CPT

## 2017-05-03 PROCEDURE — 86901 BLOOD TYPING SEROLOGIC RH(D): CPT

## 2017-05-03 PROCEDURE — 96374 THER/PROPH/DIAG INJ IV PUSH: CPT

## 2017-05-03 PROCEDURE — 84484 ASSAY OF TROPONIN QUANT: CPT

## 2017-05-03 PROCEDURE — 87633 RESP VIRUS 12-25 TARGETS: CPT

## 2017-05-03 PROCEDURE — 99285 EMERGENCY DEPT VISIT HI MDM: CPT | Mod: 25

## 2017-05-03 PROCEDURE — 96375 TX/PRO/DX INJ NEW DRUG ADDON: CPT

## 2017-05-03 PROCEDURE — 86900 BLOOD TYPING SEROLOGIC ABO: CPT

## 2017-05-03 PROCEDURE — 82550 ASSAY OF CK (CPK): CPT

## 2017-05-03 PROCEDURE — 87206 SMEAR FLUORESCENT/ACID STAI: CPT

## 2017-05-03 PROCEDURE — 94640 AIRWAY INHALATION TREATMENT: CPT

## 2017-05-03 PROCEDURE — 83735 ASSAY OF MAGNESIUM: CPT

## 2017-05-03 PROCEDURE — 87070 CULTURE OTHR SPECIMN AEROBIC: CPT

## 2017-05-03 PROCEDURE — 87015 SPECIMEN INFECT AGNT CONCNTJ: CPT

## 2017-05-03 PROCEDURE — 97163 PT EVAL HIGH COMPLEX 45 MIN: CPT

## 2017-05-03 PROCEDURE — P9016: CPT

## 2017-05-03 PROCEDURE — 81025 URINE PREGNANCY TEST: CPT

## 2017-05-03 PROCEDURE — 87102 FUNGUS ISOLATION CULTURE: CPT

## 2017-05-03 PROCEDURE — 83605 ASSAY OF LACTIC ACID: CPT

## 2017-05-03 PROCEDURE — 97116 GAIT TRAINING THERAPY: CPT

## 2017-05-03 PROCEDURE — 76705 ECHO EXAM OF ABDOMEN: CPT

## 2017-05-03 PROCEDURE — 87040 BLOOD CULTURE FOR BACTERIA: CPT

## 2017-05-03 PROCEDURE — 87486 CHLMYD PNEUM DNA AMP PROBE: CPT

## 2017-05-03 PROCEDURE — 93005 ELECTROCARDIOGRAM TRACING: CPT

## 2017-05-03 RX ORDER — ASCORBIC ACID 60 MG
1 TABLET,CHEWABLE ORAL
Qty: 0 | Refills: 0 | COMMUNITY
Start: 2017-05-03

## 2017-05-03 RX ORDER — FERROUS SULFATE 325(65) MG
1 TABLET ORAL
Qty: 0 | Refills: 0 | COMMUNITY
Start: 2017-05-03

## 2017-05-03 RX ORDER — SENNA PLUS 8.6 MG/1
2 TABLET ORAL
Qty: 0 | Refills: 0 | COMMUNITY
Start: 2017-05-03

## 2017-05-03 RX ORDER — ACETAMINOPHEN 500 MG
2 TABLET ORAL
Qty: 0 | Refills: 0 | COMMUNITY
Start: 2017-05-03

## 2017-05-03 RX ORDER — ASCORBIC ACID 60 MG
500 TABLET,CHEWABLE ORAL DAILY
Qty: 0 | Refills: 0 | Status: DISCONTINUED | OUTPATIENT
Start: 2017-05-03 | End: 2017-05-03

## 2017-05-03 RX ORDER — FOLIC ACID 0.8 MG
1 TABLET ORAL
Qty: 0 | Refills: 0 | COMMUNITY
Start: 2017-05-03

## 2017-05-03 RX ORDER — DOCUSATE SODIUM 100 MG
1 CAPSULE ORAL
Qty: 0 | Refills: 0 | COMMUNITY
Start: 2017-05-03

## 2017-05-03 RX ORDER — MULTIVIT-MIN/FERROUS GLUCONATE 9 MG/15 ML
1 LIQUID (ML) ORAL
Qty: 0 | Refills: 0 | COMMUNITY
Start: 2017-05-03

## 2017-05-03 RX ADMIN — Medication 3 MILLILITER(S): at 15:28

## 2017-05-03 RX ADMIN — Medication 325 MILLIGRAM(S): at 12:35

## 2017-05-03 RX ADMIN — Medication 1 SPRAY(S): at 12:36

## 2017-05-03 RX ADMIN — Medication 1 MILLIGRAM(S): at 12:35

## 2017-05-03 RX ADMIN — PANTOPRAZOLE SODIUM 40 MILLIGRAM(S): 20 TABLET, DELAYED RELEASE ORAL at 05:20

## 2017-05-03 RX ADMIN — Medication 1 TABLET(S): at 12:35

## 2017-05-03 RX ADMIN — Medication 3 MILLILITER(S): at 08:27

## 2017-05-03 RX ADMIN — Medication 500 MILLIGRAM(S): at 15:32

## 2017-05-03 RX ADMIN — Medication 100 MILLIGRAM(S): at 15:33

## 2017-05-03 RX ADMIN — Medication 100 MILLIGRAM(S): at 05:20

## 2017-05-03 RX ADMIN — Medication 3 MILLILITER(S): at 03:15

## 2017-05-03 RX ADMIN — ENOXAPARIN SODIUM 40 MILLIGRAM(S): 100 INJECTION SUBCUTANEOUS at 12:36

## 2017-05-03 NOTE — PROGRESS NOTE ADULT - PROBLEM SELECTOR PLAN 4
Continue Colace, Senna, Miralax
Continue Colace.  Add Senna, Miralax
resolved
Lovenox for DVT prophylaxis.    Protonix for GI prophylaxis.    Discussed with Ct surgery team and Dr. Bejarano at bedside in AM rounds.

## 2017-05-03 NOTE — PROGRESS NOTE ADULT - SUBJECTIVE AND OBJECTIVE BOX
HOSPITALIST PROGRESS NOTE    JOCELYN VALLECILLO  [id number]  56yFemale    Patient is a 56y old  Female who presents with a chief complaint of left sided chest pain x 4 days (03 May 2017 09:26)      SUBJECTIVE:   Chart reviewed since last visit.  Patient seen and examined at bedside for CAP, empyema  Feels good. Denies dyspnea.  Mild cough with pain relieved with analgesics  No nausea, vomiting or diarrhea.  Ambulating without issues.      OBJECTIVE:  Vital Signs Last 24 Hrs  T(C): 37.4, Max: 37.4 (05-03 @ 11:42)  T(F): 99.3, Max: 99.3 (05-03 @ 11:42)  HR: 86 (70 - 86)  BP: 110/60 (110/60 - 112/60)  RR: 16 (16 - 19)  SpO2: 95% (94% - 100%)    PHYSICAL EXAMINATION  General: NAD[+]   nontoxic[+]   Sitting in Chair  HEENT: AT/NC[+]   NECK: Supple[+]    CVS: RRR[+]  S1+S2[+]    RESP: Fair air entry bilaterally[+]   wheeze[-]   Crackles and decreased breath sounds LLL (improved). Surgical dressing C/D/I  GI: Soft[+]  Nondistended[+]     Bowel Sounds[+]    : suprapubic tenderness[-]     MS: FROM[-]   Edema[-]  CNS: AAOx3[-]      INTEG: Skin is Warm[+]  dry[]   PSYCH: Fair Mood, Affect    MONITOR: SR          I&O's Summary    I & Os for current day (as of 03 May 2017 11:49)  =============================================  IN: 240 ml / OUT: 300 ml / NET: -60 ml                    MEDICATIONS  (STANDING):  fluticasone propionate 50 MICROgram(s)/spray Nasal Spray 1Spray(s) Both Nostrils daily  docusate sodium 100milliGRAM(s) Oral three times a day  pantoprazole    Tablet 40milliGRAM(s) Oral before breakfast  ALBUTerol/ipratropium for Nebulization 3milliLiter(s) Nebulizer every 6 hours  enoxaparin Injectable 40milliGRAM(s) SubCutaneous daily  senna 2Tablet(s) Oral at bedtime  ferrous    sulfate 325milliGRAM(s) Oral daily  folic acid 1milliGRAM(s) Oral daily  multivitamin/minerals 1Tablet(s) Oral daily  levoFLOXacin  Tablet 500milliGRAM(s) Oral every 24 hours  ascorbic acid 500milliGRAM(s) Oral daily      MEDICATIONS  (PRN):  guaiFENesin    Syrup 100milliGRAM(s) Oral every 6 hours PRN Cough  ketorolac   Injectable 30milliGRAM(s) IV Push every 6 hours PRN Moderate Pain (4 - 6)  acetaminophen   Tablet. 650milliGRAM(s) Oral every 6 hours PRN Mild Pain (1 - 3)  aluminum hydroxide/magnesium hydroxide/simethicone Suspension 30milliLiter(s) Oral every 4 hours PRN Dyspepsia  oxyCODONE  5 mG/acetaminophen 325 mG 1Tablet(s) Oral every 4 hours PRN Moderate Pain (4 - 6)  oxyCODONE  5 mG/acetaminophen 325 mG 2Tablet(s) Oral every 4 hours PRN Severe Pain (7 - 10)  polyethylene glycol 3350 17Gram(s) Oral every other day PRN Constipation

## 2017-05-03 NOTE — DISCHARGE NOTE ADULT - HOSPITAL COURSE
56 year old female with chronic gastritis presents with left sided chest pain, fever, leukocytosis with left shift and Chest X-Ray consistent with left lung CAP (Community acquired pneumonia). She doesn't seem to have overt sepsis. Given severity of leukocytosis, relative hypoxia and relative inability to follow up with PMD if she is sent home on PO antibiotics increases her risk despite PSI of II.  Initially with persistent symptoms, worsening Chest X-Ray. CTscan Chest (04/27/17)with Large multiloculated  left hemithorax pleural effusion with residual aeration of the anterior segment of the left upper lobe without midline shift. Underwent VATS, decortication by CTS on 04/28/17 by Dr Bejarano. Chest tubes x 2 on left placed after surgery were removed 04/30/17. Mild post-operative anemia, asymptomatic. Intraoperative cultures negative to date. Leukocytosis resolved. Remained afebrile. Ambulatory with minimal symptoms. Improved significantly. Seen by PT - no need elicited.    Chronic gastritis with epigastric tenderness likely exacerbated by NSAID use, mild anemia. Continued on PPI during hospital stay. Also started on supplements for anemia.    Medically stable for discharge home.  Medications and discharge instructions reviewed with patient.    Discharge time - 38 minutes

## 2017-05-03 NOTE — PROGRESS NOTE ADULT - PROBLEM SELECTOR PROBLEM 7
Hyponatremia
Prophylactic measure
Hyponatremia
Prophylactic measure

## 2017-05-03 NOTE — DISCHARGE NOTE ADULT - SECONDARY DIAGNOSIS.
Empyema lung Chronic gastritis without bleeding, unspecified gastritis type Constipation, unspecified constipation type Normocytic anemia Hypokalemia Hyponatremia

## 2017-05-03 NOTE — PROGRESS NOTE ADULT - ASSESSMENT
56 year old female with chronic gastritis presents with left sided chest pain, fever, leukocytosis with left shift and Chest X-Ray consistent with left lung CAP (Community acquired pneumonia). She doesn't seem to have overt sepsis. Given severity of leukocytosis, relative hypoxia and relative inability to follow up with PMD if she is sent home on PO antibiotics increases her risk despite PSI of II.  Initially with persistent symptoms, worsening Chest X-Ray. CTscan Chest (04/27/17)with Large multiloculated  left hemithorax pleural effusion with residual aeration of the anterior segment of the left upper lobe without midline shift. Underwent VATS, decortication by CTS on 04/28/17. Chest tubes x 2 on left removed 04/30/17. Mild post-operative anemia, asymptomatic. Intraoperative cultures negative to date. Leukocytosis resolved. Remains afebrile. Now continuing to improve. Still with some exertional symptoms and borderline hypoxia - improved.  No need for home Ocygen.    Chronic gastritis with epigastric tenderness likely exacerbated by NSAID use, mild anemia.

## 2017-05-03 NOTE — DISCHARGE NOTE ADULT - NS AS ACTIVITY OBS
Bathing allowed/Showering allowed/Stairs allowed/Driving allowed/Walking-Indoors allowed/Walking-Outdoors allowed

## 2017-05-03 NOTE — PROGRESS NOTE ADULT - PROBLEM SELECTOR PROBLEM 4
Prophylactic measure
Constipation, unspecified constipation type
Hyponatremia

## 2017-05-03 NOTE — PROGRESS NOTE ADULT - ATTENDING COMMENTS
Discussed with Pulmonology earlier as well as CTS.  Plan for VATS in am.  Discussed with  Vinayak
Anticipate discharge home in next 24-48 hours if continues to improve.
Discussed with patient with .  Discussed with patient spouse  Vinayak [phone]    Discharge home

## 2017-05-03 NOTE — DISCHARGE NOTE ADULT - PATIENT PORTAL LINK FT
“You can access the [facility] Patient Portal, offered by [facility], by registering with the following website: [email or web address]”

## 2017-05-03 NOTE — PROGRESS NOTE ADULT - PROBLEM SELECTOR PROBLEM 3
Pneumonia
Normocytic anemia

## 2017-05-03 NOTE — DISCHARGE NOTE ADULT - PLAN OF CARE
resolution Medications as prescribed.  Follow up with PMD Medications as prescribed.  Follow up with CT Surgery Resolved

## 2017-05-03 NOTE — DISCHARGE NOTE ADULT - CARE PROVIDER_API CALL
Tesfaye Bejarano), Surgery; Thoracic Surgery  [address] [place] [address]  Phone: [phone]  Fax: [phone]

## 2017-05-03 NOTE — PROGRESS NOTE ADULT - PROBLEM SELECTOR PLAN 3
Monitor Hgb
Monitor Hgb.  Iron, Folic acid supplements
IV Rocephin.  Zithromax d/c'd by medicine (Dr. Lnog).  Pt has been afebrile, and OR cultures are negative to date.  Daily CXR.

## 2017-05-03 NOTE — PROGRESS NOTE ADULT - PROBLEM SELECTOR PLAN 8
DVTp-Lovenox  Incentive spirometry

## 2017-05-03 NOTE — PROGRESS NOTE ADULT - PROBLEM SELECTOR PROBLEM 2
Other chronic gastritis, presence of bleeding unspecified
Pain
Parapneumonic effusion

## 2017-05-03 NOTE — PROGRESS NOTE ADULT - PROBLEM SELECTOR PLAN 1
Change to Levaqui - 2 weeks  , Oxygen as needed  Robitussin for cough  CTS , Pulmonology follow up  Will have PT evaluate for home oxygen.  Encourage to ask for pain medications as needed.
Change to Levaquin - 2 weeks  Robitussin for cough  CTS , Pulmonology follow up as outpatient
Continue Rocephin for today  Follow up intraoperative cultures    Oxygen as needed  Robitussin for cough  Tylenol for fever, pain  CTS , Pulmonology follow up
Continue Rocephin, Zithromax  Follow up Blood and sputum cultures    Oxygen as needed  Robitussin for cough  Tylenol for fever, pain  CTS , Pulmonology Consultation
Continue Rocephin, Zithromax  Follow up Blood and sputum cultures  RVP    Oxygen as needed  Robitussin for cough  Tylenol for fever, pain  Repeat Chest X-Ray in am
Continue Rocephin, Zithromax  Follow up intraoperative cultures    Oxygen as needed  Robitussin for cough  Tylenol for fever, pain  CTS , Pulmonology follow up
Continue Rocephin.  Follow up intraoperative cultures    Oxygen as needed  Robitussin for cough  Tylenol for fever, pain  CTS , Pulmonology follow up
Continue Rocephin.  May discontinue Zithromax  Follow up intraoperative cultures    Oxygen as needed  Robitussin for cough  Tylenol for fever, pain  CTS , Pulmonology follow up
Now S/P RVATS RUL.  Encourage CDBE/IS and increased mobilization.  Remove Chest Tube A with cxr to follow.  May also remove Chest Tube B later this afternoon.  Daily CXR.
maintain CT  Rocephin

## 2017-05-03 NOTE — DISCHARGE NOTE ADULT - CARE PLAN
Principal Discharge DX:	Community acquired pneumonia  Goal:	resolution  Instructions for follow-up, activity and diet:	Medications as prescribed.  Follow up with PMD  Secondary Diagnosis:	Empyema lung  Instructions for follow-up, activity and diet:	Medications as prescribed.  Follow up with CT Surgery  Secondary Diagnosis:	Chronic gastritis without bleeding, unspecified gastritis type  Instructions for follow-up, activity and diet:	Medications as prescribed.  Follow up with PMD  Secondary Diagnosis:	Constipation, unspecified constipation type  Instructions for follow-up, activity and diet:	Medications as prescribed.  Follow up with PMD  Secondary Diagnosis:	Normocytic anemia  Instructions for follow-up, activity and diet:	Medications as prescribed.  Follow up with PMD  Secondary Diagnosis:	Hypokalemia  Instructions for follow-up, activity and diet:	Resolved  Secondary Diagnosis:	Hyponatremia  Instructions for follow-up, activity and diet:	Resolved

## 2017-05-03 NOTE — DISCHARGE NOTE ADULT - MEDICATION SUMMARY - MEDICATIONS TO TAKE
I will START or STAY ON the medications listed below when I get home from the hospital:    acetaminophen 325 mg oral tablet  -- 2 tab(s) by mouth every 6 hours, As needed, Mild Pain (1 - 3)  -- Indication: For Pain as needed    Advil 200 mg oral tablet  -- 2 tab(s) by mouth every 4 hours, As Needed  -- Indication: For Pain as needed    ferrous sulfate 325 mg (65 mg elemental iron) oral delayed release tablet  -- 1 tab(s) by mouth once a day  -- Indication: For anemia    docusate sodium 100 mg oral capsule  -- 1 cap(s) by mouth 3 times a day  -- Indication: For Constipation as needed    senna oral tablet  -- 2 tab(s) by mouth once a day (at bedtime)  -- Indication: For Constipation as needed    fluticasone 27.5 mcg/inh nasal spray  -- 1 spray(s) into nose once a day  -- Indication: For allergies    omeprazole 20 mg oral delayed release capsule  --  by mouth every other day  -- Indication: For Reflux    levoFLOXacin 500 mg oral tablet  -- 1 tab(s) by mouth every 24 hours till 05/15/17  -- Indication: For Pneumonia    Multiple Vitamins with Minerals oral tablet  -- 1 tab(s) by mouth once a day  -- Indication: For supplement    ascorbic acid 500 mg oral tablet  -- 1 tab(s) by mouth once a day  -- Indication: For supplement    folic acid 1 mg oral tablet  -- 1 tab(s) by mouth once a day  -- Indication: For supplement

## 2017-05-03 NOTE — PROGRESS NOTE ADULT - PROBLEM SELECTOR PROBLEM 1
Community acquired pneumonia
Lung nodule
Community acquired pneumonia
Pneumonia

## 2017-05-03 NOTE — PROGRESS NOTE ADULT - PROBLEM SELECTOR PLAN 2
Continue PPI daily instead of every other day  Avoid NSAIDs
Pain control with Toradol and Percocet.

## 2017-05-16 ENCOUNTER — FORM ENCOUNTER (OUTPATIENT)
Age: 57
End: 2017-05-16

## 2017-05-17 ENCOUNTER — OUTPATIENT (OUTPATIENT)
Dept: OUTPATIENT SERVICES | Facility: HOSPITAL | Age: 57
LOS: 1 days | End: 2017-05-17
Payer: COMMERCIAL

## 2017-05-17 ENCOUNTER — APPOINTMENT (OUTPATIENT)
Dept: THORACIC SURGERY | Facility: CLINIC | Age: 57
End: 2017-05-17

## 2017-05-17 VITALS
HEIGHT: 63 IN | BODY MASS INDEX: 24.8 KG/M2 | RESPIRATION RATE: 16 BRPM | DIASTOLIC BLOOD PRESSURE: 60 MMHG | OXYGEN SATURATION: 98 % | SYSTOLIC BLOOD PRESSURE: 113 MMHG | HEART RATE: 77 BPM | WEIGHT: 140 LBS

## 2017-05-17 DIAGNOSIS — Z87.01 PERSONAL HISTORY OF PNEUMONIA (RECURRENT): ICD-10-CM

## 2017-05-17 DIAGNOSIS — Z87.891 PERSONAL HISTORY OF NICOTINE DEPENDENCE: ICD-10-CM

## 2017-05-17 DIAGNOSIS — Z98.890 OTHER SPECIFIED POSTPROCEDURAL STATES: Chronic | ICD-10-CM

## 2017-05-17 DIAGNOSIS — Z87.19 PERSONAL HISTORY OF OTHER DISEASES OF THE DIGESTIVE SYSTEM: ICD-10-CM

## 2017-05-17 DIAGNOSIS — J86.9 PYOTHORAX W/OUT FISTULA: ICD-10-CM

## 2017-05-17 DIAGNOSIS — R06.02 SHORTNESS OF BREATH: ICD-10-CM

## 2017-05-17 DIAGNOSIS — Z98.891 HISTORY OF UTERINE SCAR FROM PREVIOUS SURGERY: Chronic | ICD-10-CM

## 2017-05-17 PROCEDURE — 71020: CPT | Mod: 26

## 2017-05-17 PROCEDURE — 71046 X-RAY EXAM CHEST 2 VIEWS: CPT

## 2017-05-17 RX ORDER — FOLIC ACID 1 MG/1
1 TABLET ORAL
Refills: 0 | Status: ACTIVE | COMMUNITY

## 2017-05-17 RX ORDER — MULTIVITAMIN
TABLET ORAL
Refills: 0 | Status: ACTIVE | COMMUNITY

## 2017-05-17 RX ORDER — OMEPRAZOLE MAGNESIUM 20 MG/1
20 CAPSULE, DELAYED RELEASE ORAL
Refills: 0 | Status: ACTIVE | COMMUNITY

## 2017-05-17 RX ORDER — NEOMYCIN/BACITRACIN/POLYMYXINB 3.5-400-5K
OINTMENT (GRAM) TOPICAL
Refills: 0 | Status: ACTIVE | COMMUNITY

## 2017-05-17 RX ORDER — FLUTICASONE PROPIONATE 50 UG/1
50 SPRAY, METERED NASAL
Refills: 0 | Status: ACTIVE | COMMUNITY

## 2017-05-17 RX ORDER — LEVOFLOXACIN 750 MG/1
TABLET, FILM COATED ORAL
Refills: 0 | Status: ACTIVE | COMMUNITY

## 2017-05-19 LAB
CULTURE RESULTS: SIGNIFICANT CHANGE UP
CULTURE RESULTS: SIGNIFICANT CHANGE UP
SPECIMEN SOURCE: SIGNIFICANT CHANGE UP
SPECIMEN SOURCE: SIGNIFICANT CHANGE UP

## 2017-05-30 ENCOUNTER — OUTPATIENT (OUTPATIENT)
Dept: OUTPATIENT SERVICES | Facility: HOSPITAL | Age: 57
LOS: 1 days | End: 2017-05-30
Payer: COMMERCIAL

## 2017-05-30 ENCOUNTER — APPOINTMENT (OUTPATIENT)
Dept: THORACIC SURGERY | Facility: CLINIC | Age: 57
End: 2017-05-30

## 2017-05-30 VITALS
SYSTOLIC BLOOD PRESSURE: 145 MMHG | HEART RATE: 72 BPM | DIASTOLIC BLOOD PRESSURE: 77 MMHG | OXYGEN SATURATION: 97 % | HEIGHT: 63 IN | WEIGHT: 140 LBS | BODY MASS INDEX: 24.8 KG/M2 | RESPIRATION RATE: 16 BRPM

## 2017-05-30 DIAGNOSIS — Z98.890 OTHER SPECIFIED POSTPROCEDURAL STATES: Chronic | ICD-10-CM

## 2017-05-30 DIAGNOSIS — Z98.891 HISTORY OF UTERINE SCAR FROM PREVIOUS SURGERY: Chronic | ICD-10-CM

## 2017-05-30 DIAGNOSIS — J86.9 PYOTHORAX WITHOUT FISTULA: ICD-10-CM

## 2017-05-30 PROCEDURE — 71020: CPT | Mod: 26

## 2017-05-30 PROCEDURE — 71046 X-RAY EXAM CHEST 2 VIEWS: CPT

## 2017-05-30 RX ORDER — CLOTRIMAZOLE 10 MG/G
1 CREAM TOPICAL
Qty: 15 | Refills: 0 | Status: DISCONTINUED | COMMUNITY
Start: 2017-05-10

## 2017-05-30 RX ORDER — AZITHROMYCIN 500 MG/1
500 TABLET, FILM COATED ORAL
Qty: 3 | Refills: 0 | Status: DISCONTINUED | COMMUNITY
Start: 2017-04-13

## 2017-06-10 LAB
CULTURE RESULTS: SIGNIFICANT CHANGE UP
SPECIMEN SOURCE: SIGNIFICANT CHANGE UP

## 2017-08-24 ENCOUNTER — APPOINTMENT (OUTPATIENT)
Dept: THORACIC SURGERY | Facility: CLINIC | Age: 57
End: 2017-08-24

## 2017-11-07 NOTE — ED PROVIDER NOTE - ENMT, MLM
Detail Level: Detailed
Detail Level: Zone
Detail Level: Simple
Detail Level: Generalized
Airway patent, Nasal mucosa clear. Mouth with normal mucosa. Throat has no vesicles, no oropharyngeal exudates and uvula is midline.

## 2019-03-04 ENCOUNTER — EMERGENCY (EMERGENCY)
Facility: HOSPITAL | Age: 59
LOS: 1 days | Discharge: HOME | End: 2019-03-04
Attending: EMERGENCY MEDICINE
Payer: COMMERCIAL

## 2019-03-04 VITALS
RESPIRATION RATE: 18 BRPM | WEIGHT: 141.98 LBS | HEIGHT: 62 IN | HEART RATE: 83 BPM | OXYGEN SATURATION: 97 % | SYSTOLIC BLOOD PRESSURE: 153 MMHG | TEMPERATURE: 98 F | DIASTOLIC BLOOD PRESSURE: 80 MMHG

## 2019-03-04 DIAGNOSIS — Z98.890 OTHER SPECIFIED POSTPROCEDURAL STATES: Chronic | ICD-10-CM

## 2019-03-04 DIAGNOSIS — Z98.891 HISTORY OF UTERINE SCAR FROM PREVIOUS SURGERY: Chronic | ICD-10-CM

## 2019-03-04 PROBLEM — K29.50 UNSPECIFIED CHRONIC GASTRITIS WITHOUT BLEEDING: Chronic | Status: ACTIVE | Noted: 2017-04-25

## 2019-03-04 LAB
ALBUMIN SERPL ELPH-MCNC: 4.5 G/DL — SIGNIFICANT CHANGE UP (ref 3.3–5.2)
ALP SERPL-CCNC: 81 U/L — SIGNIFICANT CHANGE UP (ref 40–120)
ALT FLD-CCNC: 22 U/L — SIGNIFICANT CHANGE UP
ANION GAP SERPL CALC-SCNC: 11 MMOL/L — SIGNIFICANT CHANGE UP (ref 5–17)
AST SERPL-CCNC: 17 U/L — SIGNIFICANT CHANGE UP
BASOPHILS # BLD AUTO: 0 K/UL — SIGNIFICANT CHANGE UP (ref 0–0.2)
BASOPHILS NFR BLD AUTO: 0.1 % — SIGNIFICANT CHANGE UP (ref 0–2)
BILIRUB SERPL-MCNC: <0.2 MG/DL — LOW (ref 0.4–2)
BUN SERPL-MCNC: 16 MG/DL — SIGNIFICANT CHANGE UP (ref 8–20)
CALCIUM SERPL-MCNC: 9.6 MG/DL — SIGNIFICANT CHANGE UP (ref 8.6–10.2)
CHLORIDE SERPL-SCNC: 104 MMOL/L — SIGNIFICANT CHANGE UP (ref 98–107)
CO2 SERPL-SCNC: 26 MMOL/L — SIGNIFICANT CHANGE UP (ref 22–29)
CREAT SERPL-MCNC: 0.48 MG/DL — LOW (ref 0.5–1.3)
D DIMER BLD IA.RAPID-MCNC: <150 NG/ML DDU — SIGNIFICANT CHANGE UP
EOSINOPHIL # BLD AUTO: 0.1 K/UL — SIGNIFICANT CHANGE UP (ref 0–0.5)
EOSINOPHIL NFR BLD AUTO: 0.7 % — SIGNIFICANT CHANGE UP (ref 0–6)
GLUCOSE SERPL-MCNC: 80 MG/DL — SIGNIFICANT CHANGE UP (ref 70–115)
HCT VFR BLD CALC: 38.4 % — SIGNIFICANT CHANGE UP (ref 37–47)
HGB BLD-MCNC: 12.8 G/DL — SIGNIFICANT CHANGE UP (ref 12–16)
LYMPHOCYTES # BLD AUTO: 1.4 K/UL — SIGNIFICANT CHANGE UP (ref 1–4.8)
LYMPHOCYTES # BLD AUTO: 16 % — LOW (ref 20–55)
MCHC RBC-ENTMCNC: 30.2 PG — SIGNIFICANT CHANGE UP (ref 27–31)
MCHC RBC-ENTMCNC: 33.3 G/DL — SIGNIFICANT CHANGE UP (ref 32–36)
MCV RBC AUTO: 90.6 FL — SIGNIFICANT CHANGE UP (ref 81–99)
MONOCYTES # BLD AUTO: 0.6 K/UL — SIGNIFICANT CHANGE UP (ref 0–0.8)
MONOCYTES NFR BLD AUTO: 7 % — SIGNIFICANT CHANGE UP (ref 3–10)
NEUTROPHILS # BLD AUTO: 6.7 K/UL — SIGNIFICANT CHANGE UP (ref 1.8–8)
NEUTROPHILS NFR BLD AUTO: 76.1 % — HIGH (ref 37–73)
NT-PROBNP SERPL-SCNC: 41 PG/ML — SIGNIFICANT CHANGE UP (ref 0–300)
PLATELET # BLD AUTO: 272 K/UL — SIGNIFICANT CHANGE UP (ref 150–400)
POTASSIUM SERPL-MCNC: 4.2 MMOL/L — SIGNIFICANT CHANGE UP (ref 3.5–5.3)
POTASSIUM SERPL-SCNC: 4.2 MMOL/L — SIGNIFICANT CHANGE UP (ref 3.5–5.3)
PROT SERPL-MCNC: 7.1 G/DL — SIGNIFICANT CHANGE UP (ref 6.6–8.7)
RBC # BLD: 4.24 M/UL — LOW (ref 4.4–5.2)
RBC # FLD: 12.9 % — SIGNIFICANT CHANGE UP (ref 11–15.6)
SODIUM SERPL-SCNC: 141 MMOL/L — SIGNIFICANT CHANGE UP (ref 135–145)
TROPONIN T SERPL-MCNC: <0.01 NG/ML — SIGNIFICANT CHANGE UP (ref 0–0.06)
WBC # BLD: 8.8 K/UL — SIGNIFICANT CHANGE UP (ref 4.8–10.8)
WBC # FLD AUTO: 8.8 K/UL — SIGNIFICANT CHANGE UP (ref 4.8–10.8)

## 2019-03-04 PROCEDURE — 99283 EMERGENCY DEPT VISIT LOW MDM: CPT

## 2019-03-04 PROCEDURE — 99285 EMERGENCY DEPT VISIT HI MDM: CPT

## 2019-03-04 PROCEDURE — 71046 X-RAY EXAM CHEST 2 VIEWS: CPT

## 2019-03-04 PROCEDURE — 85027 COMPLETE CBC AUTOMATED: CPT

## 2019-03-04 PROCEDURE — 93010 ELECTROCARDIOGRAM REPORT: CPT

## 2019-03-04 PROCEDURE — 80053 COMPREHEN METABOLIC PANEL: CPT

## 2019-03-04 PROCEDURE — 93005 ELECTROCARDIOGRAM TRACING: CPT

## 2019-03-04 PROCEDURE — 71046 X-RAY EXAM CHEST 2 VIEWS: CPT | Mod: 26

## 2019-03-04 PROCEDURE — 36415 COLL VENOUS BLD VENIPUNCTURE: CPT

## 2019-03-04 PROCEDURE — 85379 FIBRIN DEGRADATION QUANT: CPT

## 2019-03-04 PROCEDURE — 83880 ASSAY OF NATRIURETIC PEPTIDE: CPT

## 2019-03-04 PROCEDURE — 84484 ASSAY OF TROPONIN QUANT: CPT

## 2019-03-04 NOTE — ED STATDOCS - ATTENDING CONTRIBUTION TO CARE
I, Carmella Welsh, performed the initial face to face bedside interview with this patient regarding history of present illness, review of symptoms and relevant past medical, social and family history.  I completed an independent physical examination.  I was the initial provider who evaluated this patient. I have signed out the follow up of any pending tests (i.e. labs, radiological studies) to the ACP.  I have communicated the patient’s plan of care and disposition with the ACP.

## 2019-03-04 NOTE — ED STATDOCS - NS ED ROS FT
+cough, chest tightness, sore throat, chills, nasal congestion  Denies f/n/v/palpitations/ rash/headache/dizziness/abd.pain/d/c/dysuria/hematuria

## 2019-03-04 NOTE — ED STATDOCS - CLINICAL SUMMARY MEDICAL DECISION MAKING FREE TEXT BOX
Pt with most likely viral syndrome, will obtain CXR to r/o PNA, basic labs due to prior hx  of severe PNA requiring drainage, EKG although unlikely ACS due to pain appearing mor musculoskeletal on exam with pain to back and anterior chest wall, pt also asking for note for work.  Reeval

## 2019-03-04 NOTE — ED STATDOCS - PROGRESS NOTE DETAILS
NP NOTE:  HPI, ROS, PE of intake doctor reviewed.  Labs, EKG and CXR reviewed.  No evidence of PNA, trop, d-dimer negative.  Will d/c home supportive care.

## 2019-03-04 NOTE — ED ADULT TRIAGE NOTE - CHIEF COMPLAINT QUOTE
Patient states that she has been a dry cough for the last 3 days. Patient states that she is also having pain in her chest when coughing. Patient states that she is also feeling a little SOB

## 2019-03-04 NOTE — ED STATDOCS - MUSCULOSKELETAL, MLM
range of motion is not limited.  Tenderness on palpation to right trapezius.  No calf tenderness bilaterally.  No LE edema bilaterally

## 2019-03-04 NOTE — ED STATDOCS - OBJECTIVE STATEMENT
57 y/o F, with hx of PNA (2 years ago) requiring drainage and asthma, presents to the ED c/o persistent cough and congestion, onset yesterday.  Pt states that cough is nonproductive.  Associated sx include back pain, sore throat, chills, and chest tightness.  Back pain onset 3 days ago and back pain and chest tightness is worse when coughing.  Pt's  at bedside states that he is concerned that pt has PNA again, due to the back pain when coughing.  Denies self medicating for pain.  Denies hx of DVT or PE.  Denies recent travel.  Denies f/n/v/palpitations/ rash/headache/dizziness/abd.pain/d/c/dysuria/hematuria 57 y/o F, with hx of PNA (2 years ago) requiring drainage, asthma, presents to the ED c/o persistent cough and congestion, onset yesterday.  Pt states that cough is nonproductive.  Associated sx include back pain when coughing, sore throat, chills, and chest tightness with cough only.  Back pain onset 3 days ago and back pain and chest tightness is worse when coughing.  Pt's  at bedside states that he is concerned that pt has PNA again, due to the back pain when coughing.  Denies self medicating for pain.  Denies hx of DVT or PE.  Denies recent travel.  Denies f/n/v/sob/palpitations/ rash/headache/dizziness/abd.pain/d/c/dysuria/hematuria

## 2019-03-04 NOTE — ED ADULT NURSE NOTE - OBJECTIVE STATEMENT
assumed patient care at 1610.  Labs drawn already.  Pt c/o pain in chest and back as well as back of head.  Some SOB as well.  Comfortable appearance with no acute distress.  Awaiting results.

## 2019-11-05 NOTE — PROGRESS NOTE ADULT - PROVIDER SPECIALTY LIST ADULT
CT Surgery Area M Indication Text: Tumors in this location are included in Area M (cheek, forehead, scalp, neck, jawline and pretibial skin).  Mohs surgery is indicated for tumors in these anatomic locations.

## 2020-07-29 NOTE — H&P ADULT - BACK
RN NOTES

DR. WOODRUFF MADE AWARE OF THE EPISODES OF PVC'S LAST NIGHT AND SOME CUPLETS WITH NO NEW ORDER 
WILL ENDORSED TO AM SHIFT NURSE detailed exam

## 2022-07-11 NOTE — PHYSICAL THERAPY INITIAL EVALUATION ADULT - PHYSICAL ASSIST/NONPHYSICAL ASSIST: SUPINE/SIT, REHAB EVAL
on the discharge service for the patient. I have reviewed and made amendments to the documentation where necessary.
verbal cues/1 person assist